# Patient Record
Sex: FEMALE | Race: BLACK OR AFRICAN AMERICAN | NOT HISPANIC OR LATINO
[De-identification: names, ages, dates, MRNs, and addresses within clinical notes are randomized per-mention and may not be internally consistent; named-entity substitution may affect disease eponyms.]

---

## 2017-01-17 ENCOUNTER — RECORD ABSTRACTING (OUTPATIENT)
Age: 37
End: 2017-01-17

## 2017-01-17 DIAGNOSIS — Z78.9 OTHER SPECIFIED HEALTH STATUS: ICD-10-CM

## 2017-01-17 DIAGNOSIS — O00.90 UNSPECIFIED. ECTOPIC. PREGNANCY WITHOUT INTRAUTERINE PREGNANCY: ICD-10-CM

## 2017-01-17 DIAGNOSIS — Z83.3 FAMILY HISTORY OF DIABETES MELLITUS: ICD-10-CM

## 2017-08-29 ENCOUNTER — RESULT REVIEW (OUTPATIENT)
Age: 37
End: 2017-08-29

## 2018-06-25 ENCOUNTER — RESULT REVIEW (OUTPATIENT)
Age: 38
End: 2018-06-25

## 2019-08-24 ENCOUNTER — EMERGENCY (EMERGENCY)
Facility: HOSPITAL | Age: 39
LOS: 0 days | Discharge: HOME | End: 2019-08-25
Attending: EMERGENCY MEDICINE | Admitting: EMERGENCY MEDICINE
Payer: COMMERCIAL

## 2019-08-24 VITALS
SYSTOLIC BLOOD PRESSURE: 148 MMHG | HEIGHT: 72 IN | HEART RATE: 82 BPM | TEMPERATURE: 97 F | DIASTOLIC BLOOD PRESSURE: 78 MMHG | RESPIRATION RATE: 18 BRPM | WEIGHT: 253.09 LBS | OXYGEN SATURATION: 99 %

## 2019-08-24 DIAGNOSIS — N93.9 ABNORMAL UTERINE AND VAGINAL BLEEDING, UNSPECIFIED: ICD-10-CM

## 2019-08-24 DIAGNOSIS — O03.4 INCOMPLETE SPONTANEOUS ABORTION WITHOUT COMPLICATION: ICD-10-CM

## 2019-08-24 DIAGNOSIS — Z88.1 ALLERGY STATUS TO OTHER ANTIBIOTIC AGENTS STATUS: ICD-10-CM

## 2019-08-24 PROCEDURE — 99284 EMERGENCY DEPT VISIT MOD MDM: CPT

## 2019-08-24 RX ORDER — SODIUM CHLORIDE 9 MG/ML
1000 INJECTION INTRAMUSCULAR; INTRAVENOUS; SUBCUTANEOUS ONCE
Refills: 0 | Status: COMPLETED | OUTPATIENT
Start: 2019-08-24 | End: 2019-08-24

## 2019-08-24 RX ORDER — KETOROLAC TROMETHAMINE 30 MG/ML
15 SYRINGE (ML) INJECTION ONCE
Refills: 0 | Status: DISCONTINUED | OUTPATIENT
Start: 2019-08-24 | End: 2019-08-24

## 2019-08-24 NOTE — ED PROVIDER NOTE - CLINICAL SUMMARY MEDICAL DECISION MAKING FREE TEXT BOX
38 yo female with vaginal bleeding s/p medical TOP, elevated hCG, seen  by OB-GYN, put on beta hCG list for tomorrow, stable for d.c home.

## 2019-08-24 NOTE — ED PROVIDER NOTE - PROGRESS NOTE DETAILS
The patient appears well, beta hCG is elevated, no IUP or definite ectopic pregnancy.  Case  d/w OB-GYN, will see patient in ED. The patient was seen by OB-GYN and cleared for d/c home.  She is [facility].  Advised to f/u OB-GYN clinic, strict return precautions given.  Incomplete ab vs pregnancy of unknown location. Patient to be discharged from ED. Any available test results were discussed with patient . Verbal instructions given, including instructions to return to ED immediately for any new, worsening, or concerning symptoms. Patient endorsed understanding. Written discharge instructions additionally given, including follow-up plan.  Patient was given opportunity to ask questions.

## 2019-08-24 NOTE — ED PROVIDER NOTE - NSFOLLOWUPCLINICS_GEN_ALL_ED_FT
[facility]  OB/GYN  [address] [place] [address]  Phone: [phone]  Fax:   Follow Up Time: 4-6 Days

## 2019-08-24 NOTE — ED PROVIDER NOTE - PHYSICAL EXAMINATION
Vital Signs: I have reviewed the initial vital signs.  Constitutional: well-nourished, appears stated age, no acute distress  HEENT: PERRL, mmm  Cardiovascular: regular rate, regular rhythm, well-perfused extremities  Respiratory: unlabored respiratory effort, clear to auscultation bilaterally, speaking full sentences  Gastrointestinal: soft, non-distended abdomen, no pulsatile mass, mild rt pelvic ttp without rebound or guarding  Musculoskeletal: supple neck, no lower extremity edema, no midline spine or CVA ttp  Integumentary: warm, dry, no rash  Neurologic: awake, alert, cranial nerves II-XII grossly intact, extremities’ motor and sensory functions grossly intact  Psychiatric: appropriate mood, appropriate affect

## 2019-08-24 NOTE — ED ADULT TRIAGE NOTE - CHIEF COMPLAINT QUOTE
pt co heavy/clotty bleeding from vagina a/w abd pain starting today, sts took day after pill Tuesday,  thought she was pregnant. denies fever, n/v/d,

## 2019-08-24 NOTE — ED PROVIDER NOTE - CARE PLAN
Principal Discharge DX:	Vaginal bleeding affecting early pregnancy  Secondary Diagnosis:	Incomplete

## 2019-08-24 NOTE — ED PROVIDER NOTE - NS ED ROS FT
Constitutional: (-) fever, (-) chills  Eyes/ENT: (-) blurry vision, (-) epistaxis  Cardiovascular: (-) chest pain, (-) syncope, (-) palpitations  Respiratory: (-) cough, (-) shortness of breath  Gastrointestinal: (-)nausea or vomiting, (-) diarrhea, (+) lower abdominal pain  :(-) dysuria, (+) vaginal bleeding  Musculoskeletal: (-) neck pain, (-) back pain, (-) joint pain  Integumentary: (-) rash, (-) edema  Neurological: (-) headache, (-) altered mental status  Allergic/Immunologic: (-) pruritus

## 2019-08-24 NOTE — ED PROVIDER NOTE - NSFOLLOWUPINSTRUCTIONS_ED_ALL_ED_FT
Vaginal Bleeding in Pregnancy: Threatened     A threatened miscarriage occurs when you have vaginal bleeding during your first 20 weeks of pregnancy but the pregnancy has not ended. If you have vaginal bleeding during this time, your health care provider will do tests to make sure you are still pregnant. If the tests show you are still pregnant and the developing baby (fetus) inside your womb (uterus) is still growing, your condition is considered a threatened miscarriage. A threatened miscarriage does not mean your pregnancy will end, but it does increase the risk of losing your pregnancy.    SEEK IMMEDIATE MEDICAL CARE IF YOU HAVE THE FOLLOWING SYMPTOMS: heavy vaginal bleeding, severe low back or abdominal cramps, fever/chills, or lightheadedness/dizziness.

## 2019-08-24 NOTE — ED PROVIDER NOTE - OBJECTIVE STATEMENT
38 yo female h/o anemia here for evaluation of vaginal bleeding with clots for 1 day.  LMP in June, [facility]", had pregnancy test done, her hCG was in the 200 range, she took morning after pill on Tuesday and " nothing happened", On Thursday labs were repeated and this time hCG was in 500s, she was given another pill ( either Wed or Thu), started spotting yesterday, more bleeding today.  Will get labs, pelvic sono, treat pain , give fluids and reassess.

## 2019-08-25 LAB
ANION GAP SERPL CALC-SCNC: 13 MMOL/L — SIGNIFICANT CHANGE UP (ref 7–14)
APPEARANCE UR: CLEAR — SIGNIFICANT CHANGE UP
BACTERIA # UR AUTO: NEGATIVE — SIGNIFICANT CHANGE UP
BASOPHILS # BLD AUTO: 0.01 K/UL — SIGNIFICANT CHANGE UP (ref 0–0.2)
BASOPHILS NFR BLD AUTO: 0.1 % — SIGNIFICANT CHANGE UP (ref 0–1)
BILIRUB UR-MCNC: NEGATIVE — SIGNIFICANT CHANGE UP
BLD GP AB SCN SERPL QL: SIGNIFICANT CHANGE UP
BUN SERPL-MCNC: 12 MG/DL — SIGNIFICANT CHANGE UP (ref 10–20)
CALCIUM SERPL-MCNC: 9.1 MG/DL — SIGNIFICANT CHANGE UP (ref 8.5–10.1)
CHLORIDE SERPL-SCNC: 105 MMOL/L — SIGNIFICANT CHANGE UP (ref 98–110)
CO2 SERPL-SCNC: 21 MMOL/L — SIGNIFICANT CHANGE UP (ref 17–32)
COLOR SPEC: YELLOW — SIGNIFICANT CHANGE UP
CREAT SERPL-MCNC: 1 MG/DL — SIGNIFICANT CHANGE UP (ref 0.7–1.5)
DIFF PNL FLD: ABNORMAL
EOSINOPHIL # BLD AUTO: 0.15 K/UL — SIGNIFICANT CHANGE UP (ref 0–0.7)
EOSINOPHIL NFR BLD AUTO: 2.2 % — SIGNIFICANT CHANGE UP (ref 0–8)
EPI CELLS # UR: 2 /HPF — SIGNIFICANT CHANGE UP (ref 0–5)
GLUCOSE SERPL-MCNC: 96 MG/DL — SIGNIFICANT CHANGE UP (ref 70–99)
GLUCOSE UR QL: NEGATIVE — SIGNIFICANT CHANGE UP
HCG SERPL-ACNC: 1425 MIU/ML — HIGH
HCT VFR BLD CALC: 27.3 % — LOW (ref 37–47)
HGB BLD-MCNC: 8.3 G/DL — LOW (ref 12–16)
HYALINE CASTS # UR AUTO: 2 /LPF — SIGNIFICANT CHANGE UP (ref 0–7)
IMM GRANULOCYTES NFR BLD AUTO: 0.3 % — SIGNIFICANT CHANGE UP (ref 0.1–0.3)
KETONES UR-MCNC: SIGNIFICANT CHANGE UP
LEUKOCYTE ESTERASE UR-ACNC: NEGATIVE — SIGNIFICANT CHANGE UP
LYMPHOCYTES # BLD AUTO: 2.3 K/UL — SIGNIFICANT CHANGE UP (ref 1.2–3.4)
LYMPHOCYTES # BLD AUTO: 33.5 % — SIGNIFICANT CHANGE UP (ref 20.5–51.1)
MCHC RBC-ENTMCNC: 23.9 PG — LOW (ref 27–31)
MCHC RBC-ENTMCNC: 30.4 G/DL — LOW (ref 32–37)
MCV RBC AUTO: 78.4 FL — LOW (ref 81–99)
MONOCYTES # BLD AUTO: 0.72 K/UL — HIGH (ref 0.1–0.6)
MONOCYTES NFR BLD AUTO: 10.5 % — HIGH (ref 1.7–9.3)
NEUTROPHILS # BLD AUTO: 3.66 K/UL — SIGNIFICANT CHANGE UP (ref 1.4–6.5)
NEUTROPHILS NFR BLD AUTO: 53.4 % — SIGNIFICANT CHANGE UP (ref 42.2–75.2)
NITRITE UR-MCNC: NEGATIVE — SIGNIFICANT CHANGE UP
NRBC # BLD: 0 /100 WBCS — SIGNIFICANT CHANGE UP (ref 0–0)
PH UR: 6 — SIGNIFICANT CHANGE UP (ref 5–8)
PLATELET # BLD AUTO: 268 K/UL — SIGNIFICANT CHANGE UP (ref 130–400)
POTASSIUM SERPL-MCNC: 3.9 MMOL/L — SIGNIFICANT CHANGE UP (ref 3.5–5)
POTASSIUM SERPL-SCNC: 3.9 MMOL/L — SIGNIFICANT CHANGE UP (ref 3.5–5)
PROT UR-MCNC: ABNORMAL
RBC # BLD: 3.48 M/UL — LOW (ref 4.2–5.4)
RBC # FLD: 16.7 % — HIGH (ref 11.5–14.5)
RBC CASTS # UR COMP ASSIST: 60 /HPF — HIGH (ref 0–4)
SODIUM SERPL-SCNC: 139 MMOL/L — SIGNIFICANT CHANGE UP (ref 135–146)
SP GR SPEC: 1.04 — HIGH (ref 1.01–1.02)
UROBILINOGEN FLD QL: SIGNIFICANT CHANGE UP
WBC # BLD: 6.86 K/UL — SIGNIFICANT CHANGE UP (ref 4.8–10.8)
WBC # FLD AUTO: 6.86 K/UL — SIGNIFICANT CHANGE UP (ref 4.8–10.8)
WBC UR QL: 17 /HPF — HIGH (ref 0–5)

## 2019-08-25 PROCEDURE — 76856 US EXAM PELVIC COMPLETE: CPT | Mod: 26

## 2019-08-25 RX ADMIN — SODIUM CHLORIDE 1000 MILLILITER(S): 9 INJECTION INTRAMUSCULAR; INTRAVENOUS; SUBCUTANEOUS at 01:10

## 2019-08-25 RX ADMIN — Medication 15 MILLIGRAM(S): at 01:09

## 2019-08-25 NOTE — CONSULT NOTE ADULT - SUBJECTIVE AND OBJECTIVE BOX
SATINDER BARKER   39y   Female   [id number]    Chief Complaint/Reason for Consultation: Pregnancy of unknown location    HPI: 38 yo  at 9w by clare LMP of 2019 here for vaginal bleeding s/p medical termination. Pt reports that she had a vaginal delivery 15 months ago and felt recently like she was pregnant again. [facility], where it was noted that she had a urine pos pregnancy test, no IUP or adnexal mass on TVUS and bhcg of 200s in serum on 2019. She was counseled regarding her options of management and she decided she wanted to go with medical management. She reports that she took the first pill on Tuesday and the rest of the pills on Wednesday. She did not experience any cramping or vaginal bleeding at first. Then went back to the clinic on 2019 to see if it worked. They told her it was too early to have results, therefore she went back on 2019, on which she started spotting. She had another bhcg value done that day, which came out to be in the 500s per pt. Pt reports that after that appt on 2019 she started bleeding very heavily with nickel sized clots, not able to quantify as she passed a lot of the time in the bathroom. Denies passing anything that looks like tissue. But since Friday night she only had light bleeding that would not even soak a pad. She never experienced severe cramping either. She denies fever/chills, N/V, diarrhea, abnormal vaginal discharge, SOB, chest pain, palpitations, dysuria, frequency, urgency, sore throat, runny nose, cough, sick contacts and recent travel. She also reports having chronic anemia and that she feels dizzy at times. She reports not having a GYN. This is unplanned and undesired pregnancy.      MEDICATIONS:  None    ALLERGIES:  amoxicillin (itchiness)    PAST MEDICAL & SURGICAL HISTORY:  No pertinent medical history  H/o dilation and curettage    OB/GYN HISTORY:    Gyn: h/o fibroids; h/o abnormal pap w/ neg repeat since then all neg; denies history of STIs and ovarian cysts  Obstetric: ; FT  x1; ETOP x5 all w/ D&Cs; SAB x2 w/ D&Cs      FAMILY HISTORY:  No pertinent family history    SOCIAL HISTORY:   Denies cigarette use, alcohol use, or illicit drug use    REVIEW OF SYSTEMS:  Neg unless otherwise indicated in HPI    Vital Signs Last 24 Hrs  T(C): 36.2 (24 Aug 2019 22:31), Max: 36.2 (24 Aug 2019 22:31)  T(F): 97.1 (24 Aug 2019 22:31), Max: 97.1 (24 Aug 2019 22:31)  HR: 82 (24 Aug 2019 22:31) (82 - 82)  BP: 148/78 (24 Aug 2019 22:31) (148/78 - 148/78)  RR: 18 (24 Aug 2019 22:31) (18 - 18)  SpO2: 99% (24 Aug 2019 22:31) (99% - 99%)    Physical Exam:  Constitutional: AAOx3, NAD  Respiratory: CTAB  Cardiovascular: NL S1/S2  Gastrointestinal: Soft, nontender, nondistended, no rebound, guarding, or rigidity  Pelvic: Normal vulva, normal vagina, 2 cc bleeding noted, speculum inserted, 5cc of dark brown blood noted with clots, small amount of tissue with blood clots protruding out of the cervix, otherwise cervix normal, external os 0.5 cm dilated, no active bleeding, no abnormal discharge, uterus and adnexa cannot be palpated    LABS:                        8.3    6.86  )-----------( 268      ( 24 Aug 2019 23:30 )             27.3     HCG Quantitative, Serum: 1425.0 mIU/mL (19 @ 23:30)    ABO RH Interpretation: O POS (19 @ 23:30)  Antibody Screen: NEG (19 @ 23:30)        139  |  105  |  12  ----------------------------<  96  3.9   |  21  |  1.0    Ca    9.1      24 Aug 2019 23:30    Urinalysis Basic - ( 25 Aug 2019 00:30 )  Color: Yellow / Appearance: Clear / S.037 / pH: x  Gluc: x / Ketone: Trace  / Bili: Negative / Urobili: <2 mg/dL   Blood: x / Protein: 30 mg/dL / Nitrite: Negative   Leuk Esterase: Negative / RBC: 60 /HPF / WBC 17 /HPF   Sq Epi: x / Non Sq Epi: 2 /HPF / Bacteria: Negative    RADIOLOGY & ADDITIONAL STUDIES:    TVUS (2019):    FINDINGS:     The uterus measures 11.6 x 6.5 x 6.9 cm.   The endometrial echo complex measures 1.2 cm with debris noted within the   endometrial canal.   No intrauterine pregnancy is identified at this time.   Partially calcified uterine fibroids are noted, the largest fibroids   measures 4.4 x 4 x 4.3 cm and 5.5 x 4.3 x 5.3 cm.     There is trace free pelvic fluid.     The right ovary measures 3.6 x 2 x 1.9 cm. The left ovary measures 2.2 x 1.9   x 1.8 cm.   No extraovarian adnexal mass identified.     Doppler flow is demonstrated to both ovaries. SATINDER BARKER   39y   Female   [id number]    Chief Complaint/Reason for Consultation: Pregnancy of unknown location    HPI: 38 yo  at 9w by clare LMP of 2019 here for vaginal bleeding s/p medical termination. Pt reports that she had a vaginal delivery 15 months ago and felt recently like she was pregnant again. [facility], where it was noted that she had a urine pos pregnancy test, no IUP or adnexal mass on TVUS and bhcg of 200s in serum on 2019. She was counseled regarding her options of management and she decided she wanted to go with medical management. She reports that she took the first pill on 2019 and the rest of the pills on 2019 She did not experience any cramping or vaginal bleeding at first. Then went back to the clinic on 2019 to see if it worked. They told her it was too early to have results, therefore she went back on 2019, on which she started spotting. She had another bhcg value done that day, which came out to be in the 500s per pt. Although pt is not very clear on bhcg values. Pt reports that after that appt on 2019 she started bleeding very heavily with nickel sized clots, not able to quantify as she passed a lot of the time in the bathroom. Denies passing anything that looks like tissue. But since Friday night she only had light bleeding that would not even soak a pad. She never experienced severe cramping either. She denies fever/chills, N/V, diarrhea, abnormal vaginal discharge, SOB, chest pain, palpitations, dysuria, frequency, urgency, sore throat, runny nose, cough, sick contacts and recent travel. She also reports having chronic anemia and that she feels dizzy at times. She reports not having a GYN. This is unplanned and undesired pregnancy.      MEDICATIONS:  None    ALLERGIES:  amoxicillin (itchiness)    PAST MEDICAL & SURGICAL HISTORY:  No pertinent medical history  H/o dilation and curettage    OB/GYN HISTORY:    Gyn: h/o fibroids; h/o abnormal pap w/ neg repeat since then all neg; denies history of STIs and ovarian cysts  Obstetric: ; FT  x1; ETOP x5 all w/ D&Cs; SAB x2 w/ D&Cs      FAMILY HISTORY:  No pertinent family history    SOCIAL HISTORY:   Denies cigarette use, alcohol use, or illicit drug use    REVIEW OF SYSTEMS:  Neg unless otherwise indicated in HPI    Vital Signs Last 24 Hrs  T(C): 36.2 (24 Aug 2019 22:31), Max: 36.2 (24 Aug 2019 22:31)  T(F): 97.1 (24 Aug 2019 22:31), Max: 97.1 (24 Aug 2019 22:31)  HR: 82 (24 Aug 2019 22:31) (82 - 82)  BP: 148/78 (24 Aug 2019 22:31) (148/78 - 148/78)  RR: 18 (24 Aug 2019 22:31) (18 - 18)  SpO2: 99% (24 Aug 2019 22:31) (99% - 99%)    Physical Exam:  Constitutional: AAOx3, NAD  Respiratory: CTAB  Cardiovascular: NL S1/S2  Gastrointestinal: Soft, nontender, nondistended, no rebound, guarding, or rigidity  Pelvic: Normal vulva, normal vagina, 2 cc bleeding noted, speculum inserted, 5cc of dark brown blood noted with clots, small amount of tissue with blood clots protruding out of the cervix, otherwise cervix normal, external os 0.5 cm dilated, no active bleeding, no abnormal discharge, uterus and adnexa cannot be palpated    LABS:                        8.3    6.86  )-----------( 268      ( 24 Aug 2019 23:30 )             27.3     HCG Quantitative, Serum: 1425.0 mIU/mL (19 @ 23:30)    ABO RH Interpretation: O POS (19 @ 23:30)  Antibody Screen: NEG (19 @ 23:30)        139  |  105  |  12  ----------------------------<  96  3.9   |  21  |  1.0    Ca    9.1      24 Aug 2019 23:30    Urinalysis Basic - ( 25 Aug 2019 00:30 )  Color: Yellow / Appearance: Clear / S.037 / pH: x  Gluc: x / Ketone: Trace  / Bili: Negative / Urobili: <2 mg/dL   Blood: x / Protein: 30 mg/dL / Nitrite: Negative   Leuk Esterase: Negative / RBC: 60 /HPF / WBC 17 /HPF   Sq Epi: x / Non Sq Epi: 2 /HPF / Bacteria: Negative    RADIOLOGY & ADDITIONAL STUDIES:    TVUS (2019):    FINDINGS:     The uterus measures 11.6 x 6.5 x 6.9 cm.   The endometrial echo complex measures 1.2 cm with debris noted within the   endometrial canal.   No intrauterine pregnancy is identified at this time.   Partially calcified uterine fibroids are noted, the largest fibroids   measures 4.4 x 4 x 4.3 cm and 5.5 x 4.3 x 5.3 cm.     There is trace free pelvic fluid.     The right ovary measures 3.6 x 2 x 1.9 cm. The left ovary measures 2.2 x 1.9   x 1.8 cm.   No extraovarian adnexal mass identified.     Doppler flow is demonstrated to both ovaries.

## 2019-08-25 NOTE — CONSULT NOTE ADULT - ASSESSMENT
40 yo  at 9w by clare LMP of 2019 w/ vaginal bleeding s/p medical termination, w/ pregnancy of unknown location, w/ incomplete  vs. early IUP, cannot r/o ectopic pregnancy, hemodynamically and clinically stable, no acute OB/GYN interventions needed,     -Ectopic/bleeding/infection precautions given  -F/u bhcg and progesterone on 19 (script given) - come up with a plan accordingly  -Close f/u on beta list    Dr. Abad and Dr. Hahn to be made aware. 38 yo  at 9w by clare LMP of 2019 w/ vaginal bleeding s/p medical termination, w/ pregnancy of unknown location, w/ most likely incomplete , cannot r/o ectopic pregnancy, hemodynamically and clinically stable, no acute OB/GYN interventions needed,     -Ectopic/bleeding/infection precautions given  -F/u bhcg and progesterone on 19 (script given) - come up with a plan accordingly  -Close f/u on beta list    Dr. Abad and Dr. Hahn aware. 40 yo  at 9w by clare LMP of 2019 w/ vaginal bleeding s/p medical termination, w/ pregnancy of unknown location, w/ most likely incomplete , cannot r/o ectopic pregnancy, hemodynamically and clinically stable, no acute OB/GYN interventions needed,     -Ectopic/bleeding/infection precautions given  -F/u bhcg and progesterone on 19 (script given)  -Close f/u on beta list  -disposition per ED    Dr. Abad and Dr. Hahn aware.

## 2019-08-25 NOTE — ED ADULT NURSE NOTE - OBJECTIVE STATEMENT
came in c/o vaginal bleed. [facility] and [facility] . As per patient the bleeding got worse today (clots) . Patient is alert ,oriented .

## 2019-08-25 NOTE — ED ADULT NURSE NOTE - NSIMPLEMENTINTERV_GEN_ALL_ED
Implemented All Universal Safety Interventions:  [place] to call system. Call bell, personal items and telephone within reach. Instruct patient to call for assistance. Room bathroom lighting operational. Non-slip footwear when patient is off stretcher. Physically safe environment: no spills, clutter or unnecessary equipment. Stretcher in lowest position, wheels locked, appropriate side rails in place.

## 2019-08-26 ENCOUNTER — LABORATORY RESULT (OUTPATIENT)
Age: 39
End: 2019-08-26

## 2019-08-26 ENCOUNTER — OUTPATIENT (OUTPATIENT)
Dept: OUTPATIENT SERVICES | Facility: HOSPITAL | Age: 39
LOS: 1 days | Discharge: HOME | End: 2019-08-26

## 2019-08-26 DIAGNOSIS — Z34.00 ENCOUNTER FOR SUPERVISION OF NORMAL FIRST PREGNANCY, UNSPECIFIED TRIMESTER: ICD-10-CM

## 2019-09-14 ENCOUNTER — OUTPATIENT (OUTPATIENT)
Dept: OUTPATIENT SERVICES | Facility: HOSPITAL | Age: 39
LOS: 1 days | Discharge: HOME | End: 2019-09-14

## 2019-09-14 ENCOUNTER — LABORATORY RESULT (OUTPATIENT)
Age: 39
End: 2019-09-14

## 2019-09-14 DIAGNOSIS — Z34.00 ENCOUNTER FOR SUPERVISION OF NORMAL FIRST PREGNANCY, UNSPECIFIED TRIMESTER: ICD-10-CM

## 2019-09-14 NOTE — CHART NOTE - NSCHARTNOTEFT_GEN_A_CORE
PGY2 note    38 yo  presented to the ED at 9w by LMP of 2019 for vaginal bleeding s/p medical termination. Had a vaginal delivery 15 months prior and felt like she was pregnant again. Went to see her PMD, where it was noted that she had a urine pos pregnancy test, no IUP or adnexal mass on TVUS and bhcg of 200s in serum on 2019. She was counseled regarding her options of management and she decided she wanted to go with medical management. Took the first pill on 2019 and the rest of the pills on 2019. She went back on 2019, on which she started spotting. She had another bhcg value done that day, 500s per pt (pt not sure). Pt reported that after that appt on 2019 she started bleeding very heavily, now only light bleeding. Unplanned and undesired pregnancy.     Running     ddx: most likely incomplete , cannot r/o ectopic    Lab work  @ [facility]    : bhcg  (given mifepristone + misoprostol)    : bhcg 5389    : dx with failed AB      : bhcg 1425, O pos,  6.86>8.3/27.3<268, 139/3.9/105/21/12/1.0<96, UA 30 protein, mod blood    : bhcg 475, progesterone 2.9    : bhcg <0.6       TVUS: The uterus measures 11.6 x 6.5 x 6.9 cm.   The endometrial echo complex measures 1.2 cm with debris noted within the endometrial canal. No intrauterine pregnancy is identified at this time. Partially calcified uterine fibroids are noted, the largest fibroids measures 4.4 x 4 x 4.3 cm and 5.5 x 4.3 x 5.3 cm. There is trace free pelvic fluid. The right ovary measures 3.6 x 2 x 1.9 cm. The left ovary measures 2.2 x 1.9 x 1.8 cm. No extraovarian adnexal mass identified. Doppler flow is demonstrated to both ovaries.     Course  ": [facility], no son[facility] with IUP necessary before medical termination (nurse named Claudia?)      9/3: repeat bhcg (pt going [address] at 1500) NO SHOW    -, : left VM    : spoke to pt, going for beta    9/10: script did not work, pt to tell us where to fax script    -: left VM     - attending and patient aware    A/P 39y  likely with missed , no gyn intervention necessary  -GYN to sign off PGY2 note    40 yo  presented to the ED at 9w by LMP of 2019 for vaginal bleeding s/p medical termination. Had a vaginal delivery 15 months prior and felt like she was pregnant again. Went to see her PMD, where it was noted that she had a urine pos pregnancy test, no IUP or adnexal mass on TVUS and bhcg of 200s in serum on 2019. She was counseled regarding her options of management and she decided she wanted to go with medical management. Took the first pill on 2019 and the rest of the pills on 2019. She went back on 2019, on which she started spotting. She had another bhcg value done that day, 500s per pt (pt not sure). Pt reported that after that appt on 2019 she started bleeding very heavily, now only light bleeding. Unplanned and undesired pregnancy.     Running     ddx: most likely incomplete , cannot r/o ectopic    Lab work  @ [facility]    : bhcg  (given mifepristone + misoprostol)    : bhcg 5389    : dx with failed AB      : bhcg 1425, O pos,  6.86>8.3/27.3<268, 139/3.9/105/21/12/1.0<96, UA 30 protein, mod blood    : bhcg 475, progesterone 2.9    : bhcg <0.6       TVUS: The uterus measures 11.6 x 6.5 x 6.9 cm.   The endometrial echo complex measures 1.2 cm with debris noted within the endometrial canal. No intrauterine pregnancy is identified at this time. Partially calcified uterine fibroids are noted, the largest fibroids measures 4.4 x 4 x 4.3 cm and 5.5 x 4.3 x 5.3 cm. There is trace free pelvic fluid. The right ovary measures 3.6 x 2 x 1.9 cm. The left ovary measures 2.2 x 1.9 x 1.8 cm. No extraovarian adnexal mass identified. Doppler flow is demonstrated to both ovaries.     Course  ": [facility] no [facility] with IUP necessary before medical termination (nurse named Claudia?)      9/3: repeat bhcg (pt going [address] at 1500) NO SHOW    -, : left VM    : spoke to pt, going for beta    9/10: script did not work, pt to tell us where to fax script    -: left VM     - attending and patient aware    A/P 39y  likely with missed , no gyn intervention necessary  -GYN to sign off    Dr. Maradiaga and Dr. Tijerina aware

## 2019-09-15 NOTE — CHART NOTE - NSCHARTNOTEFT_GEN_A_CORE
40yo  at 9w by clare LMP 2019 on 2019 was here for vaginal bleedng s/p medical termination. Had a vaginal delivery 15 months prior and felt like she was pregnant again. [facility] where it was noted she had a positive urine pos pregnany test, no IUP or adnexal mass on TVUS and bhcg of 2015 in serum on 2019. She was counseled regarding her options of management and she decided she wanted to go with [facility] management. Took mifepristone on 2019 and misoprostol on 2019. She went back on 2019, on which day she started spotting. She had another bhcg value done that day, 5389. Pt reports that after that appt on 2019 she started bleeding very heavily, at presentation to our ED only light bleeding. They diagnosed her with failed  on 2019. Unplanned and undesired pregnancy.     On 2019, pt wasn't sure about her bhcg values on 2019 and on 2019 and we did not have the information we have now from [facility]. Her lab work that day was as follows; bhcg 1425, O pso, 6.86>8.3/27.3<268, 139/3.9/105/21/12/1.0<96, UA 30 protein, mod blood. TVUS showed no intrauterine pregnancy or extraovarian adnexal mass suspicious for an ectopic. We told her we could not be sure if she had an incomplete , abnormal IUP, normal IUP or an ectopic. She was given ectopic, bleeding and infection precautions. Was sent home with a script to repeat her bhcg and progesterone on 2019. Pt went to get this bloodwork done and her bhcg dropped to 475, and progesterone was 2.9, pointing towards an incomplete medical . She was counseled on the importance of following the bhcg to neg. She showed understanding and was given scripts to go. Afterwards she did not go and she did not answer our phone calls for a while. Yesterday, on 2019, she finally had a chance to repeat her bhcg and it was <0.6. She no longer has any complaints of vaginal bleeding. No pain, no abnormal discharge or any other complaints on the phone today. She is informed that she most likely passed all of the tissue and she is no longer in danger for getting an ectopic pregnancy and will no longer be followed on our list.     Dr. Kuo and Dr. Brothers aware.

## 2020-07-13 ENCOUNTER — RESULT REVIEW (OUTPATIENT)
Age: 40
End: 2020-07-13

## 2020-07-13 ENCOUNTER — EMERGENCY (EMERGENCY)
Facility: HOSPITAL | Age: 40
LOS: 0 days | Discharge: HOME | End: 2020-07-13
Attending: EMERGENCY MEDICINE | Admitting: EMERGENCY MEDICINE
Payer: COMMERCIAL

## 2020-07-13 VITALS
RESPIRATION RATE: 17 BRPM | DIASTOLIC BLOOD PRESSURE: 70 MMHG | TEMPERATURE: 99 F | OXYGEN SATURATION: 99 % | WEIGHT: 238.1 LBS | SYSTOLIC BLOOD PRESSURE: 150 MMHG | HEART RATE: 95 BPM

## 2020-07-13 VITALS
SYSTOLIC BLOOD PRESSURE: 131 MMHG | DIASTOLIC BLOOD PRESSURE: 60 MMHG | TEMPERATURE: 98 F | RESPIRATION RATE: 16 BRPM | HEART RATE: 82 BPM | OXYGEN SATURATION: 100 %

## 2020-07-13 DIAGNOSIS — Z87.42 PERSONAL HISTORY OF OTHER DISEASES OF THE FEMALE GENITAL TRACT: ICD-10-CM

## 2020-07-13 DIAGNOSIS — O09.529 SUPERVISION OF ELDERLY MULTIGRAVIDA, UNSPECIFIED TRIMESTER: ICD-10-CM

## 2020-07-13 DIAGNOSIS — Z34.90 ENCOUNTER FOR SUPERVISION OF NORMAL PREGNANCY, UNSPECIFIED, UNSPECIFIED TRIMESTER: ICD-10-CM

## 2020-07-13 DIAGNOSIS — N93.9 ABNORMAL UTERINE AND VAGINAL BLEEDING, UNSPECIFIED: ICD-10-CM

## 2020-07-13 DIAGNOSIS — Z88.1 ALLERGY STATUS TO OTHER ANTIBIOTIC AGENTS STATUS: ICD-10-CM

## 2020-07-13 DIAGNOSIS — O20.9 HEMORRHAGE IN EARLY PREGNANCY, UNSPECIFIED: ICD-10-CM

## 2020-07-13 LAB
ALBUMIN SERPL ELPH-MCNC: 3.6 G/DL — SIGNIFICANT CHANGE UP (ref 3.5–5.2)
ALP SERPL-CCNC: 84 U/L — SIGNIFICANT CHANGE UP (ref 30–115)
ALT FLD-CCNC: 7 U/L — SIGNIFICANT CHANGE UP (ref 0–41)
ANION GAP SERPL CALC-SCNC: 9 MMOL/L — SIGNIFICANT CHANGE UP (ref 7–14)
APPEARANCE UR: CLEAR — SIGNIFICANT CHANGE UP
APTT BLD: 24.2 SEC — LOW (ref 27–39.2)
AST SERPL-CCNC: 10 U/L — SIGNIFICANT CHANGE UP (ref 0–41)
BACTERIA # UR AUTO: NEGATIVE — SIGNIFICANT CHANGE UP
BASOPHILS # BLD AUTO: 0.02 K/UL — SIGNIFICANT CHANGE UP (ref 0–0.2)
BASOPHILS NFR BLD AUTO: 0.3 % — SIGNIFICANT CHANGE UP (ref 0–1)
BILIRUB SERPL-MCNC: <0.2 MG/DL — SIGNIFICANT CHANGE UP (ref 0.2–1.2)
BILIRUB UR-MCNC: NEGATIVE — SIGNIFICANT CHANGE UP
BLD GP AB SCN SERPL QL: SIGNIFICANT CHANGE UP
BUN SERPL-MCNC: 11 MG/DL — SIGNIFICANT CHANGE UP (ref 10–20)
CALCIUM SERPL-MCNC: 8.8 MG/DL — SIGNIFICANT CHANGE UP (ref 8.5–10.1)
CHLORIDE SERPL-SCNC: 105 MMOL/L — SIGNIFICANT CHANGE UP (ref 98–110)
CO2 SERPL-SCNC: 23 MMOL/L — SIGNIFICANT CHANGE UP (ref 17–32)
COLOR SPEC: YELLOW — SIGNIFICANT CHANGE UP
CREAT SERPL-MCNC: 0.7 MG/DL — SIGNIFICANT CHANGE UP (ref 0.7–1.5)
DIFF PNL FLD: ABNORMAL
EOSINOPHIL # BLD AUTO: 0.14 K/UL — SIGNIFICANT CHANGE UP (ref 0–0.7)
EOSINOPHIL NFR BLD AUTO: 2.2 % — SIGNIFICANT CHANGE UP (ref 0–8)
EPI CELLS # UR: 1 /HPF — SIGNIFICANT CHANGE UP (ref 0–5)
GLUCOSE SERPL-MCNC: 106 MG/DL — HIGH (ref 70–99)
GLUCOSE UR QL: NEGATIVE — SIGNIFICANT CHANGE UP
HCG SERPL-ACNC: 6894 MIU/ML — HIGH
HCT VFR BLD CALC: 26.8 % — LOW (ref 37–47)
HGB BLD-MCNC: 8.3 G/DL — LOW (ref 12–16)
HYALINE CASTS # UR AUTO: 1 /LPF — SIGNIFICANT CHANGE UP (ref 0–7)
IMM GRANULOCYTES NFR BLD AUTO: 0.3 % — SIGNIFICANT CHANGE UP (ref 0.1–0.3)
INR BLD: 1.03 RATIO — SIGNIFICANT CHANGE UP (ref 0.65–1.3)
KETONES UR-MCNC: NEGATIVE — SIGNIFICANT CHANGE UP
LEUKOCYTE ESTERASE UR-ACNC: NEGATIVE — SIGNIFICANT CHANGE UP
LYMPHOCYTES # BLD AUTO: 2.26 K/UL — SIGNIFICANT CHANGE UP (ref 1.2–3.4)
LYMPHOCYTES # BLD AUTO: 35.1 % — SIGNIFICANT CHANGE UP (ref 20.5–51.1)
MCHC RBC-ENTMCNC: 25.7 PG — LOW (ref 27–31)
MCHC RBC-ENTMCNC: 31 G/DL — LOW (ref 32–37)
MCV RBC AUTO: 83 FL — SIGNIFICANT CHANGE UP (ref 81–99)
MONOCYTES # BLD AUTO: 0.69 K/UL — HIGH (ref 0.1–0.6)
MONOCYTES NFR BLD AUTO: 10.7 % — HIGH (ref 1.7–9.3)
NEUTROPHILS # BLD AUTO: 3.3 K/UL — SIGNIFICANT CHANGE UP (ref 1.4–6.5)
NEUTROPHILS NFR BLD AUTO: 51.4 % — SIGNIFICANT CHANGE UP (ref 42.2–75.2)
NITRITE UR-MCNC: NEGATIVE — SIGNIFICANT CHANGE UP
NRBC # BLD: 0 /100 WBCS — SIGNIFICANT CHANGE UP (ref 0–0)
PH UR: 5.5 — SIGNIFICANT CHANGE UP (ref 5–8)
PLATELET # BLD AUTO: 247 K/UL — SIGNIFICANT CHANGE UP (ref 130–400)
POTASSIUM SERPL-MCNC: 4.4 MMOL/L — SIGNIFICANT CHANGE UP (ref 3.5–5)
POTASSIUM SERPL-SCNC: 4.4 MMOL/L — SIGNIFICANT CHANGE UP (ref 3.5–5)
PROT SERPL-MCNC: 6.5 G/DL — SIGNIFICANT CHANGE UP (ref 6–8)
PROT UR-MCNC: SIGNIFICANT CHANGE UP
PROTHROM AB SERPL-ACNC: 11.8 SEC — SIGNIFICANT CHANGE UP (ref 9.95–12.87)
RBC # BLD: 3.23 M/UL — LOW (ref 4.2–5.4)
RBC # FLD: 15.2 % — HIGH (ref 11.5–14.5)
RBC CASTS # UR COMP ASSIST: 14 /HPF — HIGH (ref 0–4)
SODIUM SERPL-SCNC: 137 MMOL/L — SIGNIFICANT CHANGE UP (ref 135–146)
SP GR SPEC: 1.03 — HIGH (ref 1.01–1.02)
UROBILINOGEN FLD QL: SIGNIFICANT CHANGE UP
WBC # BLD: 6.43 K/UL — SIGNIFICANT CHANGE UP (ref 4.8–10.8)
WBC # FLD AUTO: 6.43 K/UL — SIGNIFICANT CHANGE UP (ref 4.8–10.8)
WBC UR QL: 1 /HPF — SIGNIFICANT CHANGE UP (ref 0–5)

## 2020-07-13 PROCEDURE — 76830 TRANSVAGINAL US NON-OB: CPT | Mod: 26

## 2020-07-13 PROCEDURE — 99283 EMERGENCY DEPT VISIT LOW MDM: CPT

## 2020-07-13 PROCEDURE — 99285 EMERGENCY DEPT VISIT HI MDM: CPT

## 2020-07-13 PROCEDURE — 88305 TISSUE EXAM BY PATHOLOGIST: CPT | Mod: 26

## 2020-07-13 PROCEDURE — 76830 TRANSVAGINAL US NON-OB: CPT | Mod: 26,77

## 2020-07-13 RX ORDER — SODIUM CHLORIDE 9 MG/ML
1000 INJECTION INTRAMUSCULAR; INTRAVENOUS; SUBCUTANEOUS ONCE
Refills: 0 | Status: COMPLETED | OUTPATIENT
Start: 2020-07-13 | End: 2020-07-13

## 2020-07-13 RX ORDER — MORPHINE SULFATE 50 MG/1
2 CAPSULE, EXTENDED RELEASE ORAL ONCE
Refills: 0 | Status: DISCONTINUED | OUTPATIENT
Start: 2020-07-13 | End: 2020-07-13

## 2020-07-13 RX ADMIN — SODIUM CHLORIDE 1000 MILLILITER(S): 9 INJECTION INTRAMUSCULAR; INTRAVENOUS; SUBCUTANEOUS at 01:41

## 2020-07-13 RX ADMIN — MORPHINE SULFATE 2 MILLIGRAM(S): 50 CAPSULE, EXTENDED RELEASE ORAL at 01:41

## 2020-07-13 NOTE — ED ADULT NURSE NOTE - NSIMPLEMENTINTERV_GEN_ALL_ED
Implemented All Universal Safety Interventions:  [place] to call system. Call bell, personal items and telephone within reach. Instruct patient to call for assistance. Room bathroom lighting operational. Non-slip footwear when patient is off stretcher. Physically safe environment: no spills, clutter or unnecessary equipment. Stretcher in lowest position, wheels locked, appropriate side rails in place.

## 2020-07-13 NOTE — ED PROVIDER NOTE - ATTENDING CONTRIBUTION TO CARE
40 y/o female  with c/o VB since last night.  Pt unsure when her LMP was, thinks it may have been at the end of May, she thinks she might be pregnant but has not taken a home preg test.  Last night developed pelvic cramping and heavy VB with clots.  +N.  no v/d.  no f/c.  no urinary symptoms.  no cp/sob.  no ha/dizziness/loc.    pe - nad, nc/at, eomi, perrl, op -clear, cta b/l, no w/r/r, rrr, abd - soft, nt/nd, nabs, from x 4, A&O x 3, no focal neuro deficits.  -check labs, ua/ucg, pelvic sono.

## 2020-07-13 NOTE — ED PROVIDER NOTE - OBJECTIVE STATEMENT
40 yo  female hx of uterine fibroid present c/o  vaginal bleeding with lower abdominal cramping since last night. reported bleeding was heavy a night ago and she was passing clots. bleeding subsided tonight but continue with cramping pain so she came to ED for evaluation. hx of irregular menses so she doesn't recall her last night. no pregnancy test was down at home. denies  dysuria/urgency/frequency. Denies fever/chill/HA/dizziness/chest pain/palpitation/sob/abd pain/n/v/d/ black stool/bloody stool

## 2020-07-13 NOTE — CONSULT NOTE ADULT - SUBJECTIVE AND OBJECTIVE BOX
Chief Complaint: vaginal bleeding    HPI: 40yo  with LMP in April now presenting for vaginal bleeding with clots beginning yesterday.  She said bleeding began suddenly yesterday PM, with golfball-sized clot passage.  Bleeding is now less severe, using about 1-2 pads overnight.  Denies CP, SOB, palpitations, dizziness.  Also had cramping yesterday, in the lower abdomen, nonradiating, did not attempt pain relief with OTC medication.  Received morphine x1 in the ED. Pain is now resolved.  Otherwise denies headache, dysuria, hematuria, fevers, chills, N/V, diarrhea, constipation.  Last PO intake yesterday morning, last BM yesterday morning, last intercourse 2 months ago.      Ob/Gyn History:                   LMP April                   Cycle Length q30d  Obhx: FT  x1  ETOP x8, no D&C  Ectopic (2019) s/p methotrexate    Gynhx: H/o fibroids, conservative management. H/o abnormal pap smears s/p negative colposcopy. Denies history of ovarian cysts or STIs  Last Pap Smear unknown      Denies the following: constitutional symptoms, visual symptoms, cardiovascular symptoms, respiratory symptoms, GI symptoms, musculoskeletal symptoms, skin symptoms, neurologic symptoms, hematologic symptoms, allergic symptoms, psychiatric symptoms  Except any pertinent positives listed.     Home Medications: None      Allergies    amoxicillin (Unknown)          PAST MEDICAL HISTORY:  Fibroid, uterine  No significant past surgical history    PAST SURGICAL HISTORY:  No significant past surgical history      FAMILY HISTORY:  Denies significant family history    SOCIAL HISTORY: Denies cigarette use, alcohol use, or illicit drug use    Vital Signs Last 24 Hrs  T(F): 98.8 (2020 00:46), Max: 98.8 (2020 00:46)  HR: 95 (2020 00:46) (95 - 95)  BP: 150/70 (2020 00:46) (150/70 - 150/70)  RR: 17 (2020 00:46) (17 - 17)    Weight (kg): 108 (20 @ 00:46)    General Appearance - AAOx3, NAD  Heart - S1S2 regular rate and rhythm  Lung - CTA Bilaterally  Abdomen - Soft, nontender, nondistended, no rebound, no rigidity, no guarding, bowel sounds present    GYN/Pelvis:    Labia Majora - Normal  Labia Minora - Normal  Clitoris - Normal  Urethra - Normal  Vagina - 10cc of dark red blood in vagina  Cervix - 1cm dilated with clots at external cervical os, no active bleeding, no CMT    Uterus:  Size - 7wk sized, anteverted  Tenderness - None  Mass - None  Freely mobile    Adnexa:  Masses - None  Tenderness - None      Meds:   morphine  - Injectable 2 milliGRAM(s) IV Push Once      Weight (kg): 108 (20 @ 00:46)    LABS:                        8.3    6.43  )-----------( 247      ( 2020 01:40 )             26.8     HCG Quantitative, Serum: 6894.0 mIU/mL (20 @ 01:40)    ABO RH Interpretation: O POS (20 @ 01:40)  Antibody Screen: NEG (20 @ 01:40)        137  |  105  |  11  ----------------------------<  106<H>  4.4   |  23  |  0.7    Ca    8.8      2020 01:40    TPro  6.5  /  Alb  3.6  /  TBili  <0.2  /  DBili  x   /  AST  10  /  ALT  7   /  AlkPhos  84      PT/INR - ( 2020 01:40 )   PT: 11.80 sec;   INR: 1.03 ratio         PTT - ( 2020 01:40 )  PTT:24.2 sec        RADIOLOGY & ADDITIONAL STUDIES:      EXAM:  US TRANSVAGINAL         PROCEDURE DATE:  2020    INTERPRETATION:  CLINICAL HISTORY: Vaginal bleeding. Pregnancy.  COMPARISON: Pelvic sonogram May 25, 2019.  PROCEDURE: Transabdominal ultrasound of the pelvis was performed, including Doppler. Transvaginal exam was not performed as per patient's request.  LMP: Unknown.  FINDINGS:  UTERUS: The uterus is enlarged and heterogeneous in echotexture [id number] x 9.9 x 9.6 cm. There are multiple uterine fibroids with calcifications measuring up to 6.1 x 5.2 x 5.7 cm. The endometrial echo complex measures 1.3 cm.  RIGHT OVARY: Not visualized.  LEFT OVARY: measures 3.9 x 3.5 x 2.9 cm, and contains a 2.8 cm cyst. Doppler flow is demonstrated to the left ovary.   OTHER: No free fluid in the pelvis.  IMPRESSION:  No intrauterine pregnancy or extraovarian adnexal masses identified. Findings are compatible with pregnancy of unknown location for which differential considerations include early pregnancy, ectopic pregnancy and miscarriage. Follow-up with serial hCG and ultrasound in 7-10 days may be of benefit.   Multiple calcified uterine fibroids as above.  Nonvisualization of the right ovary. Chief Complaint: vaginal bleeding    HPI: 40yo  with LMP in April now presenting for vaginal bleeding with clots beginning yesterday.  She said bleeding began suddenly yesterday PM, with golfball-sized clot passage.  Bleeding is now less severe, using about 1-2 pads overnight.  Denies CP, SOB, palpitations, dizziness.  Also had cramping yesterday, in the lower abdomen, nonradiating, did not attempt pain relief with OTC medication.  Received morphine x1 in the ED. Pain is now resolved.  Otherwise denies headache, dysuria, hematuria, fevers, chills, N/V, diarrhea, constipation.  Last PO intake yesterday morning, last BM yesterday morning, last intercourse 2 months ago.      Ob/Gyn History:                   LMP April                   Cycle Length q30d  Obhx: FT  x1  ETOP x8, no D&C  Ectopic (2019) s/p methotrexate    Gynhx: H/o fibroids, conservative management. H/o abnormal pap smears s/p negative colposcopy. Denies history of ovarian cysts or STIs  Last Pap Smear unknown      Denies the following: constitutional symptoms, visual symptoms, cardiovascular symptoms, respiratory symptoms, GI symptoms, musculoskeletal symptoms, skin symptoms, neurologic symptoms, hematologic symptoms, allergic symptoms, psychiatric symptoms  Except any pertinent positives listed.     Home Medications: None      Allergies    amoxicillin (Unknown)          PAST MEDICAL HISTORY:  Fibroid, uterine  No significant past surgical history    PAST SURGICAL HISTORY:  No significant past surgical history      FAMILY HISTORY:  Denies significant family history    SOCIAL HISTORY: Denies cigarette use, alcohol use, or illicit drug use    Vital Signs Last 24 Hrs  T(F): 98.8 (2020 00:46), Max: 98.8 (2020 00:46)  HR: 95 (2020 00:46) (95 - 95)  BP: 150/70 (2020 00:46) (150/70 - 150/70)  RR: 17 (2020 00:46) (17 - 17)    Weight (kg): 108 (20 @ 00:46)    General Appearance - AAOx3, NAD  Heart - S1S2 regular rate and rhythm  Lung - CTA Bilaterally  Abdomen - Soft, nontender, nondistended, no rebound, no rigidity, no guarding, bowel sounds present    GYN/Pelvis:    Labia Majora - Normal  Labia Minora - Normal  Clitoris - Normal  Urethra - Normal  Vagina - 10cc of dark red blood in vagina  Cervix - 1cm dilated with clots at external cervical os, no heavy active bleeding, no CMT    Uterus:  Size - 7wk sized, anteverted  Tenderness - None  Mass - None  Freely mobile    Adnexa:  Masses - None  Tenderness - None      Meds:   morphine  - Injectable 2 milliGRAM(s) IV Push Once      Weight (kg): 108 (20 @ 00:46)    LABS:                        8.3    6.43  )-----------( 247      ( 2020 01:40 )             26.8     HCG Quantitative, Serum: 6894.0 mIU/mL (20 @ 01:40)    ABO RH Interpretation: O POS (20 @ 01:40)  Antibody Screen: NEG (20 @ 01:40)        137  |  105  |  11  ----------------------------<  106<H>  4.4   |  23  |  0.7    Ca    8.8      2020 01:40    TPro  6.5  /  Alb  3.6  /  TBili  <0.2  /  DBili  x   /  AST  10  /  ALT  7   /  AlkPhos  84      PT/INR - ( 2020 01:40 )   PT: 11.80 sec;   INR: 1.03 ratio         PTT - ( 2020 01:40 )  PTT:24.2 sec        RADIOLOGY & ADDITIONAL STUDIES:      EXAM:  US TRANSVAGINAL         PROCEDURE DATE:  2020    INTERPRETATION:  CLINICAL HISTORY: Vaginal bleeding. Pregnancy.  COMPARISON: Pelvic sonogram May 25, 2019.  PROCEDURE: Transabdominal ultrasound of the pelvis was performed, including Doppler. Transvaginal exam was not performed as per patient's request.  LMP: Unknown.  FINDINGS:  UTERUS: The uterus is enlarged and heterogeneous in echotexture [id number] x 9.9 x 9.6 cm. There are multiple uterine fibroids with calcifications measuring up to 6.1 x 5.2 x 5.7 cm. The endometrial echo complex measures 1.3 cm.  RIGHT OVARY: Not visualized.  LEFT OVARY: measures 3.9 x 3.5 x 2.9 cm, and contains a 2.8 cm cyst. Doppler flow is demonstrated to the left ovary.   OTHER: No free fluid in the pelvis.  IMPRESSION:  No intrauterine pregnancy or extraovarian adnexal masses identified. Findings are compatible with pregnancy of unknown location for which differential considerations include early pregnancy, ectopic pregnancy and miscarriage. Follow-up with serial hCG and ultrasound in 7-10 days may be of benefit.   Multiple calcified uterine fibroids as above.  Nonvisualization of the right ovary. Chief Complaint: vaginal bleeding    HPI: 40yo  with LMP in April now presenting for vaginal bleeding with clots beginning yesterday.  She said bleeding began suddenly yesterday PM, with golfball-sized clot passage.  Bleeding is now less severe, using about 1-2 pads overnight.  Denies CP, SOB, palpitations, dizziness.  Also had cramping yesterday, in the lower abdomen, nonradiating, did not attempt pain relief with OTC medication.  Received morphine x1 in the ED. Pain is now resolved.  Otherwise denies headache, dysuria, hematuria, fevers, chills, N/V, diarrhea, constipation.  Last PO intake yesterday morning, last BM yesterday morning, last intercourse 2 months ago.      Ob/Gyn History:                   LMP April                   Cycle Length q30d  Obhx: FT  x1  ETOP x5, SAB x3, no D&C  Ectopic (2019) s/p methotrexate    Gynhx: H/o fibroids, conservative management. H/o abnormal pap smears s/p negative colposcopy. Denies history of ovarian cysts or STIs  Last Pap Smear unknown      Denies the following: constitutional symptoms, visual symptoms, cardiovascular symptoms, respiratory symptoms, GI symptoms, musculoskeletal symptoms, skin symptoms, neurologic symptoms, hematologic symptoms, allergic symptoms, psychiatric symptoms  Except any pertinent positives listed.     Home Medications: None      Allergies    amoxicillin (Unknown)          PAST MEDICAL HISTORY:  Fibroid, uterine  No significant past surgical history    PAST SURGICAL HISTORY:  No significant past surgical history      FAMILY HISTORY:  Denies significant family history    SOCIAL HISTORY: Denies cigarette use, alcohol use, or illicit drug use    Vital Signs Last 24 Hrs  T(F): 98.8 (2020 00:46), Max: 98.8 (2020 00:46)  HR: 95 (2020 00:46) (95 - 95)  BP: 150/70 (2020 00:46) (150/70 - 150/70)  RR: 17 (2020 00:46) (17 - 17)    Weight (kg): 108 (20 @ 00:46)    General Appearance - AAOx3, NAD  Heart - S1S2 regular rate and rhythm  Lung - CTA Bilaterally  Abdomen - Soft, nontender, nondistended, no rebound, no rigidity, no guarding, bowel sounds present    GYN/Pelvis:    Labia Majora - Normal  Labia Minora - Normal  Clitoris - Normal  Urethra - Normal  Vagina - 10cc of dark red blood in vagina  Cervix - 1cm dilated with clots at external cervical os, no heavy active bleeding, no CMT    Uterus:  Size - 7wk sized, anteverted  Tenderness - None  Mass - None  Freely mobile    Adnexa:  Masses - None  Tenderness - None      Meds:   morphine  - Injectable 2 milliGRAM(s) IV Push Once      Weight (kg): 108 (20 @ 00:46)    LABS:                        8.3    6.43  )-----------( 247      ( 2020 01:40 )             26.8     HCG Quantitative, Serum: 6894.0 mIU/mL (20 @ 01:40)    ABO RH Interpretation: O POS (20 @ 01:40)  Antibody Screen: NEG (20 @ 01:40)        137  |  105  |  11  ----------------------------<  106<H>  4.4   |  23  |  0.7    Ca    8.8      2020 01:40    TPro  6.5  /  Alb  3.6  /  TBili  <0.2  /  DBili  x   /  AST  10  /  ALT  7   /  AlkPhos  84      PT/INR - ( 2020 01:40 )   PT: 11.80 sec;   INR: 1.03 ratio         PTT - ( 2020 01:40 )  PTT:24.2 sec        RADIOLOGY & ADDITIONAL STUDIES:      EXAM:  US TRANSVAGINAL         PROCEDURE DATE:  2020    INTERPRETATION:  CLINICAL HISTORY: Vaginal bleeding. Pregnancy.  COMPARISON: Pelvic sonogram May 25, 2019.  PROCEDURE: Transabdominal ultrasound of the pelvis was performed, including Doppler. Transvaginal exam was not performed as per patient's request.  LMP: Unknown.  FINDINGS:  UTERUS: The uterus is enlarged and heterogeneous in echotexture [id number] x 9.9 x 9.6 cm. There are multiple uterine fibroids with calcifications measuring up to 6.1 x 5.2 x 5.7 cm. The endometrial echo complex measures 1.3 cm.  RIGHT OVARY: Not visualized.  LEFT OVARY: measures 3.9 x 3.5 x 2.9 cm, and contains a 2.8 cm cyst. Doppler flow is demonstrated to the left ovary.   OTHER: No free fluid in the pelvis.  IMPRESSION:  No intrauterine pregnancy or extraovarian adnexal masses identified. Findings are compatible with pregnancy of unknown location for which differential considerations include early pregnancy, ectopic pregnancy and miscarriage. Follow-up with serial hCG and ultrasound in 7-10 days may be of benefit.   Multiple calcified uterine fibroids as above.  Nonvisualization of the right ovary. Chief Complaint: vaginal bleeding    HPI: 38yo  with LMP in April now presenting for vaginal bleeding with clots beginning yesterday.  She said bleeding began suddenly yesterday PM, with golfball-sized clot passage.  Bleeding is now less severe, using about 1-2 pads overnight.  Denies CP, SOB, palpitations, dizziness.  Also had cramping yesterday, in the lower abdomen, nonradiating, did not attempt pain relief with OTC medication.  Received morphine x1 in the ED. Pain is now resolved.  Otherwise denies headache, dysuria, hematuria, fevers, chills, N/V, diarrhea, constipation.  Last PO intake yesterday morning, last BM yesterday morning, last intercourse 2 months ago.      Ob/Gyn History:                   LMP April                   Cycle Length q30d  Obhx: FT  x1  ETOP x5, SAB x3, no D&C  Ectopic (2019) s/p methotrexate    Gynhx: H/o fibroids, conservative management. H/o abnormal pap smears s/p negative colposcopy. Denies history of ovarian cysts or STIs  Last Pap Smear unknown      Denies the following: constitutional symptoms, visual symptoms, cardiovascular symptoms, respiratory symptoms, GI symptoms, musculoskeletal symptoms, skin symptoms, neurologic symptoms, hematologic symptoms, allergic symptoms, psychiatric symptoms  Except any pertinent positives listed.     Home Medications: None      Allergies    amoxicillin (Unknown)          PAST MEDICAL HISTORY:  Fibroid, uterine  No significant past surgical history    PAST SURGICAL HISTORY:  No significant past surgical history      FAMILY HISTORY:  Denies significant family history    SOCIAL HISTORY: Denies cigarette use, alcohol use, or illicit drug use    Vital Signs Last 24 Hrs  T(F): 98.8 (2020 00:46), Max: 98.8 (2020 00:46)  HR: 95 (2020 00:46) (95 - 95)  BP: 150/70 (2020 00:46) (150/70 - 150/70)  RR: 17 (2020 00:46) (17 - 17)    Weight (kg): 108 (20 @ 00:46)    General Appearance - AAOx3, NAD  Heart - S1S2 regular rate and rhythm  Lung - CTA Bilaterally  Abdomen - Soft, nontender, nondistended, no rebound, no rigidity, no guarding, bowel sounds present    GYN/Pelvis:    Labia Majora - Normal  Labia Minora - Normal  Clitoris - Normal  Urethra - Normal  Vagina - 10cc of dark red blood in vagina  Cervix - 1cm dilated with clots at external cervical os, no heavy active bleeding, no CMT    Uterus:  Size - 7wk sized, anteverted  Tenderness - None  Mass - None  Freely mobile    Adnexa:  Masses - None  Tenderness - None      Meds:   morphine  - Injectable 2 milliGRAM(s) IV Push Once      Weight (kg): 108 (20 @ 00:46)    LABS:                        8.3    6.43  )-----------( 247      ( 2020 01:40 )             26.8     HCG Quantitative, Serum: 6894.0 mIU/mL (20 @ 01:40)    ABO RH Interpretation: O POS (20 @ 01:40)  Antibody Screen: NEG (20 @ 01:40)        137  |  105  |  11  ----------------------------<  106<H>  4.4   |  23  |  0.7    Ca    8.8      2020 01:40    TPro  6.5  /  Alb  3.6  /  TBili  <0.2  /  DBili  x   /  AST  10  /  ALT  7   /  AlkPhos  84      PT/INR - ( 2020 01:40 )   PT: 11.80 sec;   INR: 1.03 ratio         PTT - ( 2020 01:40 )  PTT:24.2 sec        RADIOLOGY & ADDITIONAL STUDIES:      PROCEDURE DATE:  2020      INTERPRETATION:  CLINICAL HISTORY: Vaginal bleeding.   COMPARISON: Earlier in the day  PROCEDURE: Transvaginal and transabdominal ultrasound of the pelvis was performed, including Doppler.  LMP: Unsure   FINDINGS:  UTERUS: 12.7 x 8.4 x 8.0 cm and is heterogeneous in echotexture. Multiple fibroids are identified with calcifications measuring up to 6.1 cm. The endometrial echo complex measures 1.3 cm, which is within normal limits for a premenopausal patient. No evidence of intrauterine pregnancy on this exam  Heterogeneous material is noted within the endocervical canal, may represent blood products  RIGHT OVARY: Not visualized due to bowel gas  LEFT OVARY: Not visualized due to bowel gas  OTHER: No free fluid in the pelvis.  IMPRESSION:  No evidence of intrauterine pregnancy on this examination. Findings may be related to imaging at too early of a gestational age, ectopic pregnancy, or miscarriage.  Follow-up serial beta-hCG and one week ultrasound is recommended  Heterogeneous material is noted within the endocervical canal, may represent blood products.  Fibroid uterus  Ovaries are obscured by bowel gas    EXAM:  US TRANSVAGINAL         PROCEDURE DATE:  2020    INTERPRETATION:  CLINICAL HISTORY: Vaginal bleeding. Pregnancy.  COMPARISON: Pelvic sonogram May 25, 2019.  PROCEDURE: Transabdominal ultrasound of the pelvis was performed, including Doppler. Transvaginal exam was not performed as per patient's request.  LMP: Unknown.  FINDINGS:  UTERUS: The uterus is enlarged and heterogeneous in echotexture [id number] x 9.9 x 9.6 cm. There are multiple uterine fibroids with calcifications measuring up to 6.1 x 5.2 x 5.7 cm. The endometrial echo complex measures 1.3 cm.  RIGHT OVARY: Not visualized.  LEFT OVARY: measures 3.9 x 3.5 x 2.9 cm, and contains a 2.8 cm cyst. Doppler flow is demonstrated to the left ovary.   OTHER: No free fluid in the pelvis.  IMPRESSION:  No intrauterine pregnancy or extraovarian adnexal masses identified. Findings are compatible with pregnancy of unknown location for which differential considerations include early pregnancy, ectopic pregnancy and miscarriage. Follow-up with serial hCG and ultrasound in 7-10 days may be of benefit.   Multiple calcified uterine fibroids as above.  Nonvisualization of the right ovary.

## 2020-07-13 NOTE — ED PROVIDER NOTE - CLINICAL SUMMARY MEDICAL DECISION MAKING FREE TEXT BOX
evaluated for vaginal bleeding, us performed, ob consulted, and patient will fu in 48 hrs for repeat hcg and ultrasound

## 2020-07-13 NOTE — ED PROVIDER NOTE - NSFOLLOWUPCLINICS_GEN_ALL_ED_FT
[facility]  OB/GYN  [address] [place] [address]  Phone: [phone]  Fax:   Follow Up Time: 1-3 Days

## 2020-07-13 NOTE — ED ADULT NURSE NOTE - OBJECTIVE STATEMENT
Pt presents to ED c/o vaginal bleeding w/ clots since yesterday. Pt stated was nauseous and had no appetite a few days ago as well. Pt is awake alert and not in any distress.

## 2020-07-13 NOTE — ED ADULT TRIAGE NOTE - CHIEF COMPLAINT QUOTE
pt with heavy vaginal bleeding with clots that started yesterday  pt states going through 1-2 pads per hour

## 2020-07-13 NOTE — ED PROVIDER NOTE - NSFOLLOWUPINSTRUCTIONS_ED_ALL_ED_FT
Threatened Miscarriage    A threatened miscarriage is the term for vaginal bleeding during your first 20 weeks of pregnancy but the pregnancy has not ended. If you have vaginal bleeding during this time, your health care provider will do tests to check if you are still pregnant. If the tests show you are still pregnant and the developing baby (fetus) inside your womb (uterus) is still growing, your condition is considered a threatened miscarriage. A threatened miscarriage does not mean your pregnancy will end, but it does increase the risk of losing your pregnancy. It is important to have close follow up with your obstetrician.    SEEK IMMEDIATE MEDICAL CARE IF YOU HAVE ANY OF THE FOLLOWING SYMPTOMS: heavy vaginal bleeding, severe low back or abdominal cramps, fever/chills, or lightheadedness/dizziness. *** Please go to OBGYN clinic in 48 hours for repeat blood test and ultrasound ***    Miscarriage      A miscarriage is the loss of an unborn baby (fetus) before the 20th week of pregnancy. Most miscarriages happen during the first 3 months of pregnancy. Sometimes, a miscarriage can happen before a woman knows that she is pregnant.    Having a miscarriage can be an emotional experience. If you have had a miscarriage, talk with your health care provider about any questions you may have about miscarrying, the grieving process, and your plans for future pregnancy.      What are the causes?    A miscarriage may be caused by:    Problems with the genes or chromosomes of the fetus. These problems make it impossible for the baby to develop normally. They are often the result of random errors that occur early in the development of the baby, and are not passed from parent to child (not inherited).  Infection of the cervix or uterus.  Conditions that affect hormone balance in the body.  Problems with the cervix, such as the cervix opening and thinning before pregnancy is at term (cervical insufficiency).    Problems with the uterus. These may include:    A uterus with an abnormal shape.  Fibroids in the uterus.  Congenital abnormalities. These are problems that were present at birth.  Certain medical conditions.  Smoking, drinking alcohol, or using drugs.  Injury (trauma).  In many cases, the cause of a miscarriage is not known.      What are the signs or symptoms?    Symptoms of this condition include:    Vaginal bleeding or spotting, with or without cramps or pain.  Pain or cramping in the abdomen or lower back.  Passing fluid, tissue, or blood clots from the vagina.      How is this diagnosed?    This condition may be diagnosed based on:    A physical exam.  Ultrasound.  Blood tests.  Urine tests.      How is this treated?    Treatment for a miscarriage is sometimes not necessary if you naturally pass all the tissue that was in your uterus. If necessary, this condition may be treated with:    Dilation and curettage (D&C). This is a procedure in which the cervix is stretched open and the lining of the uterus (endometrium) is scraped. This is done only if tissue from the fetus or placenta remains in the body (incomplete miscarriage).    Medicines, such as:    Antibiotic medicine, to treat infection.  Medicine to help the body pass any remaining tissue.  Medicine to reduce (contract) the size of the uterus. These medicines may be given if you have a lot of bleeding.  If you have Rh negative blood and your baby was Rh positive, you will need a shot of a medicine called Rh immunoglobulin to protect your future babies from Rh blood problems. "Rh-negative" and "Rh-positive" refer to whether or not the blood has a specific protein found on the surface of red blood cells (Rh factor).      Follow these instructions at home:      Medicines     Take over-the-counter and prescription medicines only as told by your health care provider.  If you were prescribed antibiotic medicine, take it as told by your health care provider. Do not stop taking the antibiotic even if you start to feel better.  Do not take NSAIDs, such as aspirin and ibuprofen, unless they are approved by your health care provider. These medicines can cause bleeding.      Activity     Rest as directed. Ask your health care provider what activities are safe for you.  Have someone help with home and family responsibilities during this time.      General instructions     Keep track of the number of sanitary pads you use each day and how soaked (saturated) they are. Write down this information.  Monitor the amount of tissue or blood clots that you pass from your vagina. Save any large amounts of tissue for your health care provider to examine.  Do not use tampons, douche, or have sex until your health care provider approves.  To help you and your partner with the process of grieving, talk with your health care provider or seek counseling.  When you are ready, meet with your health care provider to discuss any important steps you should take for your health. Also, discuss steps you should take to have a healthy pregnancy in the future.  Keep all follow-up visits as told by your health care provider. This is important.      Where to find more information    The American Congress of Obstetricians and Gynecologists: www.acog.orgU.S. Department of Health and Human Services Office of Women’s Health: www.womenshealth.gov      Contact a health care provider if:    You have a fever or chills.  You have a foul smelling vaginal discharge.  You have more bleeding instead of less.      Get help right away if:    You have severe cramps or pain in your back or abdomen.  You pass blood clots or tissue from your vagina that is walnut-sized or larger.  You soak more than 1 regular sanitary pad in an hour.  You become light-headed or weak.  You pass out.  You have feelings of sadness that take over your thoughts, or you have thoughts of hurting yourself.      Summary    Most miscarriages happen in the first 3 months of pregnancy. Sometimes miscarriage happens before a woman even knows that she is pregnant.  Follow your health care provider's instruction for home care. Keep all follow-up appointments.  To help you and your partner with the process of grieving, talk with your health care provider or seek counseling.    This information is not intended to replace advice given to you by your health care provider. Make sure you discuss any questions you have with your health care provider.

## 2020-07-13 NOTE — ED PROVIDER NOTE - PROGRESS NOTE DETAILS
GYN resident Mary aware of consultation GYN resident Raymond called, patient refused transvaginal sono. he was reviewing the images and couldn't completely evaluate her uterus. request to have transvaginal sono to better evaluate her uterus. endorsed to Dr. Villar to f/u transvaginal sono and gyn eval. TC: Pt signed out to me by CHASE Landon. 38 yo F with hx of uterine fibroid*** Placed GYN c/s. TC: Pt signed out to me by CHASE Landon. 40 yo F with hx of uterine fibroid*** Spoke with GYN who will call back with final recs after discussion with attending. TC: Pt signed out to me by CHASE Landon. 40 yo F with hx of uterine fibroid,  who presents with vaginal bleeding x 2 days. No fever, chills, cp, sob, dizziness, abd pain. Labs notable for hgb 8.3 (baseline), beta quant 6800s, ua with large blood. TVUS shows no IUP, heterogeneous material in endocervical cavity. Spoke with GYN who will call back with final recs after discussion with attending. TC: Pt signed out to me by CHASE Landon. 40 yo F with hx of uterine fibroid,  who presents with vaginal bleeding x 2 days. No fever, chills, cp, sob, dizziness, abd pain. Labs notable for hgb 8.3 (baseline), beta quant 6800s, Rh+, ua with large blood. TVUS shows no IUP, heterogeneous material in endocervical cavity. Spoke with GYN who will call back with final recs after discussion with attending. TC: Spoke with GYN who discussed case with attending, they stated likely miscarriage but pt will have to f/u in 48 hrs for repeat beta quant and US. Recommended rx for iron but pt declined new rx, states she already has iron supps at home. Strict ED return precautions given. Pt verbalized understanding and was agreeable with plan.

## 2020-07-13 NOTE — CONSULT NOTE ADULT - ASSESSMENT
38yo  with LMP in April,  pos, with vaginal bleeding likely SAB r/o normal IUP vs. pregnancy, clinically and hemodynamically stable  -incomplete 40yo  with LMP in April,  pos, with vaginal bleeding likely SAB r/o normal IUP vs. ectopic pregnancy, clinically and hemodynamically stable  -no acute gyn intervention at this time  -Due to decrease in vaginal bleeding and resolution of pain, patient opts for expectant management at this time  -repeat bhcg in 48hrs (7/15) at [address]  -strict ectopic and bleeding precautions  -f/u pathology  -dispo per ED    Dr. Morrow and Dr. Urrutia aware. 40yo  with LMP in April, Rh pos, with vaginal bleeding likely SAB r/o normal IUP vs. ectopic pregnancy, clinically and hemodynamically stable  -no acute gyn intervention at this time  -repeat bhcg in 48hrs (7/15) at [address]  -strict ectopic and bleeding precautions  -f/u pathology  -dispo per ED    Dr. Morrow and Dr. Urrutia aware.

## 2020-07-13 NOTE — ED PROVIDER NOTE - NS ED ROS FT
Constitutional: no fever, chills, no recent weight loss, change in appetite or malaise  Eyes: no redness/discharge/pain/vision changes  ENT: no rhinorrhea/ear pain/sore throat  Cardiac: No chest pain, SOB or edema.  Respiratory: No cough or respiratory distress  GI: No nausea, vomiting, diarrhea or abdominal pain.  : see HPI. No dysuria, frequency, urgency or hematuria  MS: no pain to back or extremities, no loss of ROM, no weakness  Neuro: No headache or weakness. No LOC.  Skin: No skin rash.  Endocrine: No history of thyroid disease or diabetes.

## 2020-07-13 NOTE — CONSULT NOTE ADULT - ATTENDING COMMENTS
38yo  with likely complete spontaneous , no heavy active bleeding at the moment, stable    - repeat bhcg in 48h to confirm drop  - start iron pills, encourage oral hydration  - bleeding and ectopic precautions discussed 40yo  with likely complete spontaneous , no heavy active bleeding at the moment, stable    - repeat bhcg in 48h to confirm drop  - f/u pathology of likely POCs removed from cervical os  - start iron pills, encourage oral hydration  - bleeding and ectopic precautions discussed

## 2020-07-13 NOTE — ED PROVIDER NOTE - PATIENT PORTAL LINK FT
You can access the FollowMyHealth Patient Portal offered by [facility] [place] by registering at the following website: [email or web address]. By joining [employer]’s FollowMyHealth portal, you will also be able to view your health information using other applications (apps) compatible with our system.

## 2020-07-13 NOTE — ED ADULT NURSE REASSESSMENT NOTE - NS ED NURSE REASSESS COMMENT FT1
pt assessed at bedside, a&o x4, in NAD at this time, no c/o pain/discomfort, comfort measures offered/maintained, urine collected and sent to lab

## 2020-07-14 LAB
CULTURE RESULTS: SIGNIFICANT CHANGE UP
SPECIMEN SOURCE: SIGNIFICANT CHANGE UP

## 2020-07-15 LAB — SURGICAL PATHOLOGY STUDY: SIGNIFICANT CHANGE UP

## 2023-06-05 NOTE — ED ADULT NURSE NOTE - NSFALLRSKINDICATORS_ED_ALL_ED
Occupational Therapy  Daily Treatment     Patient Name: Bharath Diaz  Age:  44 y.o., Sex:  male  Medical Record #: [id number]  Today's Date: 6/5/2023     Precautions  Precautions: Fall Risk, TLSO (Thoracolumbosacral orthosis), Spinal / Back Precautions   Comments: possible R partial infraspinatus tear; R rib 1,8,9 fxs; T11-L1 posterior fusion; L3 AIS B         Subjective    Pt up in w/c upon arrival, agreeable to OT session.      Objective     06/05/23 1031   OT Charge Group   OT Self Care / ADL (Units) 2   OT Neuromuscular Re-education / Balance (Units) 2   OT Total Time Spent   OT Individual Total Time Spent (Mins) 60   Functional Level of Assist   Lower Body Dressing Minimal Assist  (don/doff socks and AFO/shoes in seated figure four. VCs to problem solve positionign of AFO/shoe to effectively slide foot in, Ambar to position toes all the way into shoe. Education provided on regular skin checks after each use)   Lower Body Dressing Description Reacher;Increased time;Initial preparation for task;Set-up of equipment;Supervision for safety;Verbal cueing   Balance   Skilled Intervention Postural Facilitation;Sequencing;Tactile Cuing;Verbal Cuing   Comments In // bars with one UE support on bar, pt completed four rounds of bean bag toss alternating UE support in standing with CGA. Seated rest break between each set. Pt then completed 2x10 alternating cone toe taps with BUE support on bar and CGA   Interdisciplinary Plan of Care Collaboration   Patient Position at End of Therapy Seated;Call Light within Reach;Tray Table within Reach;Phone within Reach     Education provided on completing skin checks after every AFO use. Pt had redness on the dorsal 1st metatarsal bilaterally after wearing for ~40 min. Educated pt to check skin again in 20 minutes to monitor for changes, and to alert nursing if redness does not subside.     Assessment    Pt tolerated OT session well. Needs increased time and verbal cues to sequencing  donning/doffing AFOs/shoes in seated figure four. No LOB with balance activities but relies on one UE support to maintain balance. Min cues for LE motor control and motor planning.     Strengths: Able to follow instructions, Alert and oriented, Effective communication skills, Good carryover of learning, Good insight into deficits/needs, Independent prior level of function, Making steady progress towards goals, Manages pain appropriately, Motivated for self care and independence, Pleasant and cooperative, Supportive family, Willingly participates in therapeutic activities  Barriers: Bladder incontinence, Bladder retention, Decreased endurance, Fatigue, Generalized weakness, Impaired activity tolerance, Impaired balance, Limited mobility, Pain    Plan    Refer to primary OT POC    Passport items to be completed:  Perform bathroom transfers, complete dressing, complete feeding, get ready for the day, prepare a simple meal, participate in household tasks, adapt home for safety needs, demonstrate home exercise program, complete caregiver training     Occupational Therapy Goals (Active)       Problem: Bathing       Dates: Start:  06/01/23         Goal: STG-Within one week, patient will bathe at CGA level using DME/AE PRN.       Dates: Start:  06/01/23               Problem: Dressing       Dates: Start:  06/01/23         Goal: STG-Within one week, patient will dress UB with clothing retrieval at Mod I level, including TLSO don/doff.       Dates: Start:  06/01/23               Problem: Functional Transfers       Dates: Start:  06/01/23         Goal: STG-Within one week, patient will transfer to toilet at CGA x1 using DME/AE PRN.       Dates: Start:  06/01/23            Goal: STG-Within one week, patient will transfer to tub/shower using tub transfer bench at [facility] level using DME PRN.       Dates: Start:  06/01/23               Problem: IADL's       Dates: Start:  06/01/23         Goal: STG-Within one week, patient will  access kitchen area at w/c level with SPV using AE PRN.       Dates: Start:  06/01/23               Problem: OT Long Term Goals       Dates: Start:  05/24/23         Goal: LTG-By discharge, patient will complete basic self care tasks at Min A x1-Mod I level using DME/AE PRN.       Dates: Start:  05/24/23    Expected End:  06/08/23            Goal: LTG-By discharge, patient will perform bathroom transfers at Min A x1-Mod I level using DME/AE PRN.       Dates: Start:  05/24/23    Expected End:  06/08/23            Goal: LTG-By discharge, patient will complete basic home management at Min A x1-Mod I level using DME/AE PRN.       Dates: Start:  05/24/23    Expected End:  06/08/23               Problem: Toileting       Dates: Start:  06/01/23         Goal: STG-Within one week, patient will complete toileting tasks at Max A x1 using DME/AE PRN.       Dates: Start:  06/01/23               no

## 2024-09-03 PROBLEM — D25.9 LEIOMYOMA OF UTERUS, UNSPECIFIED: Chronic | Status: ACTIVE | Noted: 2020-07-13

## 2024-09-17 DIAGNOSIS — E66.01 MORBID (SEVERE) OBESITY DUE TO EXCESS CALORIES: ICD-10-CM

## 2024-10-02 ENCOUNTER — APPOINTMENT (OUTPATIENT)
Dept: SURGERY | Facility: CLINIC | Age: 44
End: 2024-10-02

## 2025-02-12 ENCOUNTER — APPOINTMENT (OUTPATIENT)
Dept: SURGERY | Facility: CLINIC | Age: 45
End: 2025-02-12
Payer: COMMERCIAL

## 2025-02-12 VITALS
DIASTOLIC BLOOD PRESSURE: 90 MMHG | TEMPERATURE: 97 F | BODY MASS INDEX: 39.76 KG/M2 | HEART RATE: 80 BPM | HEIGHT: 71 IN | OXYGEN SATURATION: 97 % | SYSTOLIC BLOOD PRESSURE: 142 MMHG | WEIGHT: 284 LBS

## 2025-02-12 DIAGNOSIS — E66.813 OBESITY, CLASS 3: ICD-10-CM

## 2025-02-12 PROCEDURE — 99204 OFFICE O/P NEW MOD 45 MIN: CPT

## 2025-02-15 PROBLEM — E66.813 CLASS 3 OBESITY: Status: ACTIVE | Noted: 2024-09-17

## 2025-02-21 RX ORDER — CHROMIUM 200 MCG
TABLET ORAL DAILY
Refills: 0 | Status: ACTIVE | COMMUNITY

## 2025-02-21 RX ORDER — MAGNESIUM OXIDE/MAG AA CHELATE 300 MG
CAPSULE ORAL DAILY
Refills: 0 | Status: ACTIVE | COMMUNITY

## 2025-02-26 ENCOUNTER — TRANSCRIPTION ENCOUNTER (OUTPATIENT)
Age: 45
End: 2025-02-26

## 2025-02-26 ENCOUNTER — APPOINTMENT (OUTPATIENT)
Dept: PSYCHIATRY | Facility: CLINIC | Age: 45
End: 2025-02-26

## 2025-02-26 LAB — VIT B12 SERPL-MCNC: 507 PG/ML

## 2025-03-06 ENCOUNTER — APPOINTMENT (OUTPATIENT)
Dept: OBGYN | Facility: CLINIC | Age: 45
End: 2025-03-06
Payer: COMMERCIAL

## 2025-03-06 ENCOUNTER — NON-APPOINTMENT (OUTPATIENT)
Age: 45
End: 2025-03-06

## 2025-03-06 VITALS
BODY MASS INDEX: 40.18 KG/M2 | SYSTOLIC BLOOD PRESSURE: 132 MMHG | WEIGHT: 287 LBS | HEIGHT: 71 IN | DIASTOLIC BLOOD PRESSURE: 80 MMHG | HEART RATE: 81 BPM

## 2025-03-06 DIAGNOSIS — D21.9 BENIGN NEOPLASM OF CONNECTIVE AND OTHER SOFT TISSUE, UNSPECIFIED: ICD-10-CM

## 2025-03-06 DIAGNOSIS — Z00.00 ENCOUNTER FOR GENERAL ADULT MEDICAL EXAMINATION W/OUT ABNORMAL FINDINGS: ICD-10-CM

## 2025-03-06 PROBLEM — Z63.5 DIVORCED: Status: ACTIVE | Noted: 2025-03-06

## 2025-03-06 PROCEDURE — ZZZZZ: CPT

## 2025-03-06 PROCEDURE — 99386 PREV VISIT NEW AGE 40-64: CPT

## 2025-03-07 ENCOUNTER — APPOINTMENT (OUTPATIENT)
Dept: SURGERY | Facility: CLINIC | Age: 45
End: 2025-03-07
Payer: COMMERCIAL

## 2025-03-07 PROCEDURE — 97803 MED NUTRITION INDIV SUBSEQ: CPT | Mod: 95

## 2025-03-10 ENCOUNTER — NON-APPOINTMENT (OUTPATIENT)
Age: 45
End: 2025-03-10

## 2025-03-10 ENCOUNTER — APPOINTMENT (OUTPATIENT)
Dept: PSYCHIATRY | Facility: CLINIC | Age: 45
End: 2025-03-10
Payer: COMMERCIAL

## 2025-03-10 DIAGNOSIS — Z78.9 OTHER SPECIFIED HEALTH STATUS: ICD-10-CM

## 2025-03-10 DIAGNOSIS — Z63.5 DISRUPTION OF FAMILY BY SEPARATION AND DIVORCE: ICD-10-CM

## 2025-03-10 PROCEDURE — 90791 PSYCH DIAGNOSTIC EVALUATION: CPT | Mod: 95

## 2025-03-10 SDOH — SOCIAL STABILITY - SOCIAL INSECURITY: DISRUPTION OF FAMILY BY SEPARATION AND DIVORCE: Z63.5

## 2025-03-11 LAB — HPV HIGH+LOW RISK DNA PNL CVX: NOT DETECTED

## 2025-03-13 VITALS — HEIGHT: 71 IN | BODY MASS INDEX: 39.9 KG/M2 | WEIGHT: 285 LBS

## 2025-03-14 LAB — CYTOLOGY CVX/VAG DOC THIN PREP: NORMAL

## 2025-04-01 ENCOUNTER — APPOINTMENT (OUTPATIENT)
Dept: SURGERY | Facility: CLINIC | Age: 45
End: 2025-04-01

## 2025-04-24 ENCOUNTER — TRANSCRIPTION ENCOUNTER (OUTPATIENT)
Age: 45
End: 2025-04-24

## 2025-04-24 ENCOUNTER — RESULT REVIEW (OUTPATIENT)
Age: 45
End: 2025-04-24

## 2025-04-24 ENCOUNTER — OUTPATIENT (OUTPATIENT)
Dept: OUTPATIENT SERVICES | Facility: HOSPITAL | Age: 45
LOS: 1 days | Discharge: HOME | End: 2025-04-24
Payer: COMMERCIAL

## 2025-04-24 VITALS
RESPIRATION RATE: 14 BRPM | OXYGEN SATURATION: 100 % | SYSTOLIC BLOOD PRESSURE: 164 MMHG | DIASTOLIC BLOOD PRESSURE: 91 MMHG | HEART RATE: 71 BPM

## 2025-04-24 VITALS
TEMPERATURE: 98 F | RESPIRATION RATE: 17 BRPM | SYSTOLIC BLOOD PRESSURE: 149 MMHG | WEIGHT: 283.96 LBS | HEART RATE: 65 BPM | OXYGEN SATURATION: 99 % | HEIGHT: 71 IN | DIASTOLIC BLOOD PRESSURE: 88 MMHG

## 2025-04-24 DIAGNOSIS — E66.01 MORBID (SEVERE) OBESITY DUE TO EXCESS CALORIES: ICD-10-CM

## 2025-04-24 PROCEDURE — 81025 URINE PREGNANCY TEST: CPT

## 2025-04-24 PROCEDURE — 88312 SPECIAL STAINS GROUP 1: CPT

## 2025-04-24 PROCEDURE — 43239 EGD BIOPSY SINGLE/MULTIPLE: CPT

## 2025-04-24 PROCEDURE — 88305 TISSUE EXAM BY PATHOLOGIST: CPT | Mod: 26

## 2025-04-24 PROCEDURE — 88312 SPECIAL STAINS GROUP 1: CPT | Mod: 26

## 2025-04-24 PROCEDURE — 88305 TISSUE EXAM BY PATHOLOGIST: CPT

## 2025-04-24 RX ORDER — ATOVAQUONE 750 MG/5ML
100 SUSPENSION ORAL
Refills: 0 | DISCHARGE

## 2025-04-24 NOTE — CHART NOTE - NSCHARTNOTEFT_GEN_A_CORE
PACU ANESTHESIA ADMISSION NOTE      Procedure:   Post op diagnosis:      ____  Intubated  TV:______       Rate: ______      FiO2: ______    __x__  Patent Airway    __x__  Full return of protective reflexes    __x__  Full recovery from anesthesia / back to baseline status    Vitals:  T(C): 36.4 (04-24-25 @ 09:15), Max: 36.4 (04-24-25 @ 08:56)  HR: 65 (04-24-25 @ 09:15) (65 - 65)  BP: 149/88 (04-24-25 @ 09:15) (149/88 - 149/88)  RR: 17 (04-24-25 @ 09:15) (17 - 17)  SpO2: 99% (04-24-25 @ 09:15) (99% - 99%)    Mental Status:  __x__ Awake   ___x__ Alert   _____ Drowsy   _____ Sedated    Nausea/Vomiting:  __x__ NO  ______Yes,   See Post - Op Orders          Pain Scale (0-10):  ___0__    Treatment: ____ None    __x__ See Post - Op/PCA Orders    Post - Operative Fluids:   ____ Oral   __x__ See Post - Op Orders    Plan: Discharge:   __x__Home       _____Floor     _____Critical Care    _____  Other:_________________    Comments: Patient had smooth intraoperative event, no anesthesia complication.  PACU Vital signs: See anesthesia record, pt awake and alert in PACU at time of handoff.

## 2025-04-24 NOTE — ASU DISCHARGE PLAN (ADULT/PEDIATRIC) - FINANCIAL ASSISTANCE
[facility] provides services at a reduced cost to those who are determined to be eligible through [facility]’s financial assistance program. Information regarding [facility]’s financial assistance program can be found by going to [email or web address] or by calling [phone].

## 2025-04-24 NOTE — ASU PATIENT PROFILE, ADULT - NS PRO ABUSE SCREEN AFRAID ANYONE YN
As we discussed your illness is viral.  No antibiotics are indicated at this time.  Rest and drink extra fluids.  OTC cough and cold medications as needed.  Tylenol or Motrin as needed for pain or fever.  Salt water gargles and throat lozenges for sore throat.  Follow up with PCP if no improvement.  Go to ER with worsening symptoms.      Cold Symptoms   WHAT YOU NEED TO KNOW:   A cold is an infection caused by a virus. The infection causes your upper respiratory system to become inflamed. Common symptoms of a cold include sneezing, dry throat, a stuffy nose, headache, watery eyes, and a cough. Your cough may be dry, or you may cough up mucus. You may also have muscle aches, joint pain, and tiredness. Rarely, you may have a fever. Most colds go away without treatment.  DISCHARGE INSTRUCTIONS:   Return to the emergency department if:   You have increased tiredness and weakness.    You are unable to eat.     Your heart is beating much faster than usual for you.     You see white spots in the back of your throat and your neck is swollen and sore to the touch.     You see pinpoint or larger reddish-purple dots on your skin.  Contact your healthcare provider if:   You have a fever higher than 102°F (38.9°C).    You have new or worsening shortness of breath.     You have thick nasal drainage for more than 2 days.     Your symptoms do not improve or get worse within 5 days.     You have questions or concerns about your condition or care.  Medicines:  The following medicines may be suggested by your healthcare provider to decrease your cold symptoms. These medicines are available without a doctor's order. Ask which medicines to take and when to take them. Follow directions.  NSAIDs or acetaminophen  help to bring down a fever or decrease pain.    Decongestants  help decrease nasal stuffiness.     Antihistamines  help decrease sneezing and a runny nose.     Cough suppressants  help decrease how much you cough.    Expectorants   help loosen mucus so you can cough it up.    Take your medicine as directed.  Contact your healthcare provider if you think your medicine is not helping or if you have side effects. Tell him of her if you are allergic to any medicine. Keep a list of the medicines, vitamins, and herbs you take. Include the amounts, and when and why you take them. Bring the list or the pill bottles to follow-up visits. Carry your medicine list with you in case of an emergency.  Symptom relief:  The following may help relieve cold symptoms, such as a dry throat and congestion:  Gargle with mouthwash or warm salt water as directed.     Suck on throat lozenges or hard candy.     Use a cold or warm vaporizer or humidifier to ease your breathing.     Rest for at least 2 days and then as needed to decrease tiredness and weakness.     Use petroleum based jelly around your nostrils to decrease irritation from blowing your nose.  Drink liquids:  Liquids will help thin and loosen thick mucus so you can cough it up. Liquids will also keep you hydrated. Ask your healthcare provider which liquids are best for you and how much to drink each day.  Prevent the spread of germs:  You can spread your cold germs to others for at least 3 days after your symptoms start. Wash your hands often. Do not share items, such as eating utensils. Cover your nose and mouth when you cough or sneeze using the crook of your elbow instead of your hands. Throw used tissues in the garbage.  Do not smoke:  Smoking may worsen your symptoms and increase the length of time you feel sick. Talk with your healthcare provider if you need help to stop smoking.  Follow up with your healthcare provider as directed:  Write down your questions so you remember to ask them during your visits.   © 2017 [employer] Information is for End User's use only and may not be sold, redistributed or otherwise used for commercial purposes. All illustrations and images included in  CareNotes® are the copyrighted property of [employer], [employer] or [employer].  The above information is an  only. It is not intended as medical advice for individual conditions or treatments. Talk to your doctor, nurse or pharmacist before following any medical regimen to see if it is safe and effective for you.     no

## 2025-04-24 NOTE — ASU DISCHARGE PLAN (ADULT/PEDIATRIC) - CARE PROVIDER_API CALL
Marco Antonio Crowell  Gastroenterology  [address] [place] [id number]  Phone: [phone]  Fax: [phone]  Follow Up Time:

## 2025-04-24 NOTE — ASU PATIENT PROFILE, ADULT - FALL HARM RISK - HARM RISK INTERVENTIONS

## 2025-04-25 LAB — SURGICAL PATHOLOGY STUDY: SIGNIFICANT CHANGE UP

## 2025-04-28 DIAGNOSIS — K44.9 DIAPHRAGMATIC HERNIA WITHOUT OBSTRUCTION OR GANGRENE: ICD-10-CM

## 2025-04-28 DIAGNOSIS — K20.90 ESOPHAGITIS, UNSPECIFIED WITHOUT BLEEDING: ICD-10-CM

## 2025-04-28 DIAGNOSIS — Z01.818 ENCOUNTER FOR OTHER PREPROCEDURAL EXAMINATION: ICD-10-CM

## 2025-04-28 DIAGNOSIS — E66.9 OBESITY, UNSPECIFIED: ICD-10-CM

## 2025-04-28 DIAGNOSIS — K29.50 UNSPECIFIED CHRONIC GASTRITIS WITHOUT BLEEDING: ICD-10-CM

## 2025-04-28 DIAGNOSIS — Z88.0 ALLERGY STATUS TO PENICILLIN: ICD-10-CM

## 2025-05-19 ENCOUNTER — NON-APPOINTMENT (OUTPATIENT)
Age: 45
End: 2025-05-19

## 2025-06-10 ENCOUNTER — APPOINTMENT (OUTPATIENT)
Dept: SURGERY | Facility: CLINIC | Age: 45
End: 2025-06-10

## 2025-06-19 ENCOUNTER — APPOINTMENT (OUTPATIENT)
Dept: SURGERY | Facility: CLINIC | Age: 45
End: 2025-06-19

## 2025-06-19 VITALS — WEIGHT: 276 LBS | HEIGHT: 71 IN | BODY MASS INDEX: 38.64 KG/M2

## 2025-06-19 PROCEDURE — 97803 MED NUTRITION INDIV SUBSEQ: CPT | Mod: 95

## 2025-07-01 ENCOUNTER — TRANSCRIPTION ENCOUNTER (OUTPATIENT)
Age: 45
End: 2025-07-01

## 2025-07-01 ENCOUNTER — APPOINTMENT (OUTPATIENT)
Dept: SURGERY | Facility: CLINIC | Age: 45
End: 2025-07-01

## 2025-07-01 VITALS — BODY MASS INDEX: 38.78 KG/M2 | HEIGHT: 71 IN | WEIGHT: 277 LBS

## 2025-07-01 PROCEDURE — 97803 MED NUTRITION INDIV SUBSEQ: CPT

## 2025-07-08 ENCOUNTER — NON-APPOINTMENT (OUTPATIENT)
Age: 45
End: 2025-07-08

## 2025-07-18 ENCOUNTER — NON-APPOINTMENT (OUTPATIENT)
Age: 45
End: 2025-07-18

## 2025-07-21 ENCOUNTER — OUTPATIENT (OUTPATIENT)
Dept: OUTPATIENT SERVICES | Facility: HOSPITAL | Age: 45
LOS: 1 days | End: 2025-07-21
Payer: COMMERCIAL

## 2025-07-21 VITALS
RESPIRATION RATE: 16 BRPM | HEART RATE: 80 BPM | OXYGEN SATURATION: 98 % | TEMPERATURE: 98 F | DIASTOLIC BLOOD PRESSURE: 67 MMHG | HEIGHT: 71 IN | WEIGHT: 279.99 LBS | SYSTOLIC BLOOD PRESSURE: 101 MMHG

## 2025-07-21 DIAGNOSIS — Z98.890 OTHER SPECIFIED POSTPROCEDURAL STATES: Chronic | ICD-10-CM

## 2025-07-21 DIAGNOSIS — Z01.818 ENCOUNTER FOR OTHER PREPROCEDURAL EXAMINATION: ICD-10-CM

## 2025-07-21 DIAGNOSIS — E66.01 MORBID (SEVERE) OBESITY DUE TO EXCESS CALORIES: ICD-10-CM

## 2025-07-21 DIAGNOSIS — Z98.51 TUBAL LIGATION STATUS: Chronic | ICD-10-CM

## 2025-07-21 LAB
A1C WITH ESTIMATED AVERAGE GLUCOSE RESULT: 5.3 % — SIGNIFICANT CHANGE UP (ref 4–5.6)
ALBUMIN SERPL ELPH-MCNC: 4.2 G/DL — SIGNIFICANT CHANGE UP (ref 3.5–5.2)
ALP SERPL-CCNC: 89 U/L — SIGNIFICANT CHANGE UP (ref 30–115)
ALT FLD-CCNC: 19 U/L — SIGNIFICANT CHANGE UP (ref 0–41)
ANION GAP SERPL CALC-SCNC: 11 MMOL/L — SIGNIFICANT CHANGE UP (ref 7–14)
APTT BLD: 29.4 SEC — SIGNIFICANT CHANGE UP (ref 27–39.2)
AST SERPL-CCNC: 19 U/L — SIGNIFICANT CHANGE UP (ref 0–41)
BASOPHILS # BLD AUTO: 0.02 K/UL — SIGNIFICANT CHANGE UP (ref 0–0.2)
BASOPHILS NFR BLD AUTO: 0.5 % — SIGNIFICANT CHANGE UP (ref 0–1)
BILIRUB SERPL-MCNC: 0.3 MG/DL — SIGNIFICANT CHANGE UP (ref 0.2–1.2)
BLD GP AB SCN SERPL QL: SIGNIFICANT CHANGE UP
BUN SERPL-MCNC: 14 MG/DL — SIGNIFICANT CHANGE UP (ref 10–20)
CALCIUM SERPL-MCNC: 9.1 MG/DL — SIGNIFICANT CHANGE UP (ref 8.4–10.5)
CHLORIDE SERPL-SCNC: 104 MMOL/L — SIGNIFICANT CHANGE UP (ref 98–110)
CO2 SERPL-SCNC: 24 MMOL/L — SIGNIFICANT CHANGE UP (ref 17–32)
CREAT SERPL-MCNC: 0.9 MG/DL — SIGNIFICANT CHANGE UP (ref 0.7–1.5)
EGFR: 81 ML/MIN/1.73M2 — SIGNIFICANT CHANGE UP
EGFR: 81 ML/MIN/1.73M2 — SIGNIFICANT CHANGE UP
EOSINOPHIL # BLD AUTO: 0.15 K/UL — SIGNIFICANT CHANGE UP (ref 0–0.7)
EOSINOPHIL NFR BLD AUTO: 3.4 % — SIGNIFICANT CHANGE UP (ref 0–8)
ESTIMATED AVERAGE GLUCOSE: 105 MG/DL — SIGNIFICANT CHANGE UP (ref 68–114)
GLUCOSE SERPL-MCNC: 83 MG/DL — SIGNIFICANT CHANGE UP (ref 70–99)
HCT VFR BLD CALC: 32.2 % — LOW (ref 37–47)
HGB BLD-MCNC: 10.1 G/DL — LOW (ref 12–16)
IMM GRANULOCYTES NFR BLD AUTO: 0.2 % — SIGNIFICANT CHANGE UP (ref 0.1–0.3)
INR BLD: 0.93 RATIO — SIGNIFICANT CHANGE UP (ref 0.65–1.3)
LYMPHOCYTES # BLD AUTO: 1.7 K/UL — SIGNIFICANT CHANGE UP (ref 1.2–3.4)
LYMPHOCYTES # BLD AUTO: 38.3 % — SIGNIFICANT CHANGE UP (ref 20.5–51.1)
MCHC RBC-ENTMCNC: 26 PG — LOW (ref 27–31)
MCHC RBC-ENTMCNC: 31.4 G/DL — LOW (ref 32–37)
MCV RBC AUTO: 83 FL — SIGNIFICANT CHANGE UP (ref 81–99)
MONOCYTES # BLD AUTO: 0.44 K/UL — SIGNIFICANT CHANGE UP (ref 0.1–0.6)
MONOCYTES NFR BLD AUTO: 9.9 % — HIGH (ref 1.7–9.3)
NEUTROPHILS # BLD AUTO: 2.12 K/UL — SIGNIFICANT CHANGE UP (ref 1.4–6.5)
NEUTROPHILS NFR BLD AUTO: 47.7 % — SIGNIFICANT CHANGE UP (ref 42.2–75.2)
NRBC BLD AUTO-RTO: 0 /100 WBCS — SIGNIFICANT CHANGE UP (ref 0–0)
PLATELET # BLD AUTO: 265 K/UL — SIGNIFICANT CHANGE UP (ref 130–400)
PMV BLD: 11.6 FL — HIGH (ref 7.4–10.4)
POTASSIUM SERPL-MCNC: 3.9 MMOL/L — SIGNIFICANT CHANGE UP (ref 3.5–5)
POTASSIUM SERPL-SCNC: 3.9 MMOL/L — SIGNIFICANT CHANGE UP (ref 3.5–5)
PROT SERPL-MCNC: 7.2 G/DL — SIGNIFICANT CHANGE UP (ref 6–8)
PROTHROM AB SERPL-ACNC: 10.9 SEC — SIGNIFICANT CHANGE UP (ref 9.95–12.87)
RBC # BLD: 3.88 M/UL — LOW (ref 4.2–5.4)
RBC # FLD: 15 % — HIGH (ref 11.5–14.5)
SODIUM SERPL-SCNC: 139 MMOL/L — SIGNIFICANT CHANGE UP (ref 135–146)
WBC # BLD: 4.44 K/UL — LOW (ref 4.8–10.8)
WBC # FLD AUTO: 4.44 K/UL — LOW (ref 4.8–10.8)

## 2025-07-21 PROCEDURE — 86901 BLOOD TYPING SEROLOGIC RH(D): CPT

## 2025-07-21 PROCEDURE — 99214 OFFICE O/P EST MOD 30 MIN: CPT | Mod: 25

## 2025-07-21 PROCEDURE — 86850 RBC ANTIBODY SCREEN: CPT

## 2025-07-21 PROCEDURE — 83036 HEMOGLOBIN GLYCOSYLATED A1C: CPT

## 2025-07-21 PROCEDURE — 36415 COLL VENOUS BLD VENIPUNCTURE: CPT

## 2025-07-21 PROCEDURE — 85730 THROMBOPLASTIN TIME PARTIAL: CPT

## 2025-07-21 PROCEDURE — 85610 PROTHROMBIN TIME: CPT

## 2025-07-21 PROCEDURE — 86900 BLOOD TYPING SEROLOGIC ABO: CPT

## 2025-07-21 PROCEDURE — 85025 COMPLETE CBC W/AUTO DIFF WBC: CPT

## 2025-07-21 PROCEDURE — 80053 COMPREHEN METABOLIC PANEL: CPT

## 2025-07-22 DIAGNOSIS — E66.01 MORBID (SEVERE) OBESITY DUE TO EXCESS CALORIES: ICD-10-CM

## 2025-07-22 DIAGNOSIS — Z01.818 ENCOUNTER FOR OTHER PREPROCEDURAL EXAMINATION: ICD-10-CM

## 2025-07-23 DIAGNOSIS — K29.50 UNSPECIFIED CHRONIC GASTRITIS W/OUT BLEEDING: ICD-10-CM

## 2025-07-23 RX ORDER — CELECOXIB 200 MG/1
200 CAPSULE ORAL
Qty: 4 | Refills: 0 | Status: ACTIVE | COMMUNITY
Start: 2025-07-23 | End: 1900-01-01

## 2025-07-23 RX ORDER — OMEPRAZOLE 40 MG/1
40 CAPSULE, DELAYED RELEASE ORAL
Qty: 30 | Refills: 2 | Status: ACTIVE | COMMUNITY
Start: 2025-07-23 | End: 1900-01-01

## 2025-07-25 ENCOUNTER — APPOINTMENT (OUTPATIENT)
Dept: SURGERY | Facility: CLINIC | Age: 45
End: 2025-07-25
Payer: COMMERCIAL

## 2025-07-25 VITALS
TEMPERATURE: 97 F | SYSTOLIC BLOOD PRESSURE: 130 MMHG | DIASTOLIC BLOOD PRESSURE: 82 MMHG | OXYGEN SATURATION: 98 % | HEART RATE: 74 BPM | BODY MASS INDEX: 38.5 KG/M2 | WEIGHT: 275 LBS | HEIGHT: 71 IN

## 2025-07-25 DIAGNOSIS — E66.813 OBESITY, CLASS 3: ICD-10-CM

## 2025-07-25 PROCEDURE — 99215 OFFICE O/P EST HI 40 MIN: CPT

## 2025-08-04 ENCOUNTER — RESULT REVIEW (OUTPATIENT)
Age: 45
End: 2025-08-04

## 2025-08-04 ENCOUNTER — APPOINTMENT (OUTPATIENT)
Dept: SURGERY | Facility: HOSPITAL | Age: 45
End: 2025-08-04

## 2025-08-04 ENCOUNTER — INPATIENT (INPATIENT)
Facility: HOSPITAL | Age: 45
LOS: 1 days | Discharge: HOME | DRG: 641 | End: 2025-08-06
Attending: STUDENT IN AN ORGANIZED HEALTH CARE EDUCATION/TRAINING PROGRAM | Admitting: STUDENT IN AN ORGANIZED HEALTH CARE EDUCATION/TRAINING PROGRAM
Payer: COMMERCIAL

## 2025-08-04 VITALS
DIASTOLIC BLOOD PRESSURE: 67 MMHG | RESPIRATION RATE: 18 BRPM | HEIGHT: 71 IN | OXYGEN SATURATION: 99 % | TEMPERATURE: 98 F | SYSTOLIC BLOOD PRESSURE: 136 MMHG | HEART RATE: 51 BPM | WEIGHT: 279.99 LBS

## 2025-08-04 DIAGNOSIS — Z98.51 TUBAL LIGATION STATUS: Chronic | ICD-10-CM

## 2025-08-04 DIAGNOSIS — Z98.890 OTHER SPECIFIED POSTPROCEDURAL STATES: Chronic | ICD-10-CM

## 2025-08-04 DIAGNOSIS — E66.01 MORBID (SEVERE) OBESITY DUE TO EXCESS CALORIES: ICD-10-CM

## 2025-08-04 LAB
ANION GAP SERPL CALC-SCNC: 11 MMOL/L — SIGNIFICANT CHANGE UP (ref 7–14)
BASOPHILS # BLD AUTO: 0 K/UL — SIGNIFICANT CHANGE UP (ref 0–0.2)
BASOPHILS NFR BLD AUTO: 0 % — SIGNIFICANT CHANGE UP (ref 0–1)
BUN SERPL-MCNC: 9 MG/DL — LOW (ref 10–20)
CALCIUM SERPL-MCNC: 8.8 MG/DL — SIGNIFICANT CHANGE UP (ref 8.4–10.5)
CHLORIDE SERPL-SCNC: 98 MMOL/L — SIGNIFICANT CHANGE UP (ref 98–110)
CO2 SERPL-SCNC: 26 MMOL/L — SIGNIFICANT CHANGE UP (ref 17–32)
CREAT SERPL-MCNC: 0.7 MG/DL — SIGNIFICANT CHANGE UP (ref 0.7–1.5)
EGFR: 109 ML/MIN/1.73M2 — SIGNIFICANT CHANGE UP
EGFR: 109 ML/MIN/1.73M2 — SIGNIFICANT CHANGE UP
EOSINOPHIL # BLD AUTO: 0.01 K/UL — SIGNIFICANT CHANGE UP (ref 0–0.7)
EOSINOPHIL NFR BLD AUTO: 0.1 % — SIGNIFICANT CHANGE UP (ref 0–8)
GLUCOSE SERPL-MCNC: 159 MG/DL — HIGH (ref 70–99)
HCT VFR BLD CALC: 32.2 % — LOW (ref 37–47)
HGB BLD-MCNC: 10.1 G/DL — LOW (ref 12–16)
IMM GRANULOCYTES NFR BLD AUTO: 0.6 % — HIGH (ref 0.1–0.3)
LYMPHOCYTES # BLD AUTO: 0.51 K/UL — LOW (ref 1.2–3.4)
LYMPHOCYTES # BLD AUTO: 6.1 % — LOW (ref 20.5–51.1)
MCHC RBC-ENTMCNC: 26.1 PG — LOW (ref 27–31)
MCHC RBC-ENTMCNC: 31.4 G/DL — LOW (ref 32–37)
MCV RBC AUTO: 83.2 FL — SIGNIFICANT CHANGE UP (ref 81–99)
MONOCYTES # BLD AUTO: 0.07 K/UL — LOW (ref 0.1–0.6)
MONOCYTES NFR BLD AUTO: 0.8 % — LOW (ref 1.7–9.3)
NEUTROPHILS # BLD AUTO: 7.72 K/UL — HIGH (ref 1.4–6.5)
NEUTROPHILS NFR BLD AUTO: 92.4 % — HIGH (ref 42.2–75.2)
NRBC BLD AUTO-RTO: 0 /100 WBCS — SIGNIFICANT CHANGE UP (ref 0–0)
PLATELET # BLD AUTO: 258 K/UL — SIGNIFICANT CHANGE UP (ref 130–400)
PMV BLD: 11.4 FL — HIGH (ref 7.4–10.4)
POTASSIUM SERPL-MCNC: 3.6 MMOL/L — SIGNIFICANT CHANGE UP (ref 3.5–5)
POTASSIUM SERPL-SCNC: 3.6 MMOL/L — SIGNIFICANT CHANGE UP (ref 3.5–5)
RBC # BLD: 3.87 M/UL — LOW (ref 4.2–5.4)
RBC # FLD: 14.2 % — SIGNIFICANT CHANGE UP (ref 11.5–14.5)
SODIUM SERPL-SCNC: 135 MMOL/L — SIGNIFICANT CHANGE UP (ref 135–146)
WBC # BLD: 8.36 K/UL — SIGNIFICANT CHANGE UP (ref 4.8–10.8)
WBC # FLD AUTO: 8.36 K/UL — SIGNIFICANT CHANGE UP (ref 4.8–10.8)

## 2025-08-04 PROCEDURE — S2900: CPT

## 2025-08-04 PROCEDURE — C1889: CPT

## 2025-08-04 PROCEDURE — 88305 TISSUE EXAM BY PATHOLOGIST: CPT | Mod: 26

## 2025-08-04 PROCEDURE — 88342 IMHCHEM/IMCYTCHM 1ST ANTB: CPT

## 2025-08-04 PROCEDURE — 88342 IMHCHEM/IMCYTCHM 1ST ANTB: CPT | Mod: 26

## 2025-08-04 PROCEDURE — 43775 LAP SLEEVE GASTRECTOMY: CPT

## 2025-08-04 PROCEDURE — 36415 COLL VENOUS BLD VENIPUNCTURE: CPT

## 2025-08-04 PROCEDURE — 85025 COMPLETE CBC W/AUTO DIFF WBC: CPT

## 2025-08-04 PROCEDURE — S2900 ROBOTIC SURGICAL SYSTEM: CPT | Mod: NC

## 2025-08-04 PROCEDURE — C9399: CPT

## 2025-08-04 PROCEDURE — 80048 BASIC METABOLIC PNL TOTAL CA: CPT

## 2025-08-04 PROCEDURE — 88305 TISSUE EXAM BY PATHOLOGIST: CPT

## 2025-08-04 RX ORDER — LABETALOL HYDROCHLORIDE 200 MG/1
5 TABLET, FILM COATED ORAL
Refills: 0 | Status: COMPLETED | OUTPATIENT
Start: 2025-08-04 | End: 2025-08-04

## 2025-08-04 RX ORDER — ACETAMINOPHEN 500 MG/5ML
1000 LIQUID (ML) ORAL ONCE
Refills: 0 | Status: DISCONTINUED | OUTPATIENT
Start: 2025-08-04 | End: 2025-08-04

## 2025-08-04 RX ORDER — HYDROMORPHONE/SOD CHLOR,ISO/PF 2 MG/10 ML
0.5 SYRINGE (ML) INJECTION
Refills: 0 | Status: DISCONTINUED | OUTPATIENT
Start: 2025-08-04 | End: 2025-08-04

## 2025-08-04 RX ORDER — APREPITANT 40 MG/1
40 CAPSULE ORAL ONCE
Refills: 0 | Status: COMPLETED | OUTPATIENT
Start: 2025-08-04 | End: 2025-08-04

## 2025-08-04 RX ORDER — ONDANSETRON HCL/PF 4 MG/2 ML
4 VIAL (ML) INJECTION ONCE
Refills: 0 | Status: DISCONTINUED | OUTPATIENT
Start: 2025-08-04 | End: 2025-08-04

## 2025-08-04 RX ORDER — HYDROMORPHONE/SOD CHLOR,ISO/PF 2 MG/10 ML
1 SYRINGE (ML) INJECTION
Refills: 0 | Status: DISCONTINUED | OUTPATIENT
Start: 2025-08-04 | End: 2025-08-04

## 2025-08-04 RX ORDER — ENOXAPARIN SODIUM 100 MG/ML
40 INJECTION SUBCUTANEOUS EVERY 24 HOURS
Refills: 0 | Status: DISCONTINUED | OUTPATIENT
Start: 2025-08-05 | End: 2025-08-06

## 2025-08-04 RX ORDER — ACETAMINOPHEN 500 MG/5ML
650 LIQUID (ML) ORAL EVERY 6 HOURS
Refills: 0 | Status: DISCONTINUED | OUTPATIENT
Start: 2025-08-04 | End: 2025-08-06

## 2025-08-04 RX ORDER — SODIUM CHLORIDE 9 G/1000ML
1000 INJECTION, SOLUTION INTRAVENOUS
Refills: 0 | Status: DISCONTINUED | OUTPATIENT
Start: 2025-08-04 | End: 2025-08-06

## 2025-08-04 RX ORDER — ENOXAPARIN SODIUM 100 MG/ML
40 INJECTION SUBCUTANEOUS ONCE
Refills: 0 | Status: COMPLETED | OUTPATIENT
Start: 2025-08-04 | End: 2025-08-04

## 2025-08-04 RX ORDER — ONDANSETRON HCL/PF 4 MG/2 ML
4 VIAL (ML) INJECTION EVERY 6 HOURS
Refills: 0 | Status: DISCONTINUED | OUTPATIENT
Start: 2025-08-04 | End: 2025-08-06

## 2025-08-04 RX ORDER — ACETAMINOPHEN 500 MG/5ML
1000 LIQUID (ML) ORAL ONCE
Refills: 0 | Status: COMPLETED | OUTPATIENT
Start: 2025-08-04 | End: 2025-08-05

## 2025-08-04 RX ADMIN — Medication 40 MILLIGRAM(S): at 13:43

## 2025-08-04 RX ADMIN — LABETALOL HYDROCHLORIDE 5 MILLIGRAM(S): 200 TABLET, FILM COATED ORAL at 11:45

## 2025-08-04 RX ADMIN — Medication 650 MILLIGRAM(S): at 18:20

## 2025-08-04 RX ADMIN — Medication 650 MILLIGRAM(S): at 17:57

## 2025-08-04 RX ADMIN — ENOXAPARIN SODIUM 40 MILLIGRAM(S): 100 INJECTION SUBCUTANEOUS at 07:03

## 2025-08-04 RX ADMIN — APREPITANT 40 MILLIGRAM(S): 40 CAPSULE ORAL at 07:03

## 2025-08-04 RX ADMIN — Medication 0.12 MILLIGRAM(S): at 17:57

## 2025-08-04 RX ADMIN — Medication 4 MILLIGRAM(S): at 17:57

## 2025-08-04 RX ADMIN — Medication 0.12 MILLIGRAM(S): at 13:43

## 2025-08-04 RX ADMIN — LABETALOL HYDROCHLORIDE 5 MILLIGRAM(S): 200 TABLET, FILM COATED ORAL at 12:00

## 2025-08-04 RX ADMIN — Medication 0.12 MILLIGRAM(S): at 21:29

## 2025-08-04 RX ADMIN — Medication 0.5 MILLIGRAM(S): at 11:15

## 2025-08-04 RX ADMIN — Medication 50 MILLIEQUIVALENT(S): at 21:33

## 2025-08-05 ENCOUNTER — TRANSCRIPTION ENCOUNTER (OUTPATIENT)
Age: 45
End: 2025-08-05

## 2025-08-05 LAB
ANION GAP SERPL CALC-SCNC: 13 MMOL/L — SIGNIFICANT CHANGE UP (ref 7–14)
BASOPHILS # BLD AUTO: 0 K/UL — SIGNIFICANT CHANGE UP (ref 0–0.2)
BASOPHILS NFR BLD AUTO: 0 % — SIGNIFICANT CHANGE UP (ref 0–1)
BUN SERPL-MCNC: 9 MG/DL — LOW (ref 10–20)
CALCIUM SERPL-MCNC: 9.1 MG/DL — SIGNIFICANT CHANGE UP (ref 8.4–10.5)
CHLORIDE SERPL-SCNC: 104 MMOL/L — SIGNIFICANT CHANGE UP (ref 98–110)
CO2 SERPL-SCNC: 24 MMOL/L — SIGNIFICANT CHANGE UP (ref 17–32)
CREAT SERPL-MCNC: 0.7 MG/DL — SIGNIFICANT CHANGE UP (ref 0.7–1.5)
EGFR: 109 ML/MIN/1.73M2 — SIGNIFICANT CHANGE UP
EGFR: 109 ML/MIN/1.73M2 — SIGNIFICANT CHANGE UP
EOSINOPHIL # BLD AUTO: 0 K/UL — SIGNIFICANT CHANGE UP (ref 0–0.7)
EOSINOPHIL NFR BLD AUTO: 0 % — SIGNIFICANT CHANGE UP (ref 0–8)
GLUCOSE SERPL-MCNC: 115 MG/DL — HIGH (ref 70–99)
HCT VFR BLD CALC: 31.4 % — LOW (ref 37–47)
HGB BLD-MCNC: 10.2 G/DL — LOW (ref 12–16)
IMM GRANULOCYTES NFR BLD AUTO: 0.4 % — HIGH (ref 0.1–0.3)
LYMPHOCYTES # BLD AUTO: 0.71 K/UL — LOW (ref 1.2–3.4)
LYMPHOCYTES # BLD AUTO: 10 % — LOW (ref 20.5–51.1)
MCHC RBC-ENTMCNC: 26.8 PG — LOW (ref 27–31)
MCHC RBC-ENTMCNC: 32.5 G/DL — SIGNIFICANT CHANGE UP (ref 32–37)
MCV RBC AUTO: 82.4 FL — SIGNIFICANT CHANGE UP (ref 81–99)
MONOCYTES # BLD AUTO: 0.42 K/UL — SIGNIFICANT CHANGE UP (ref 0.1–0.6)
MONOCYTES NFR BLD AUTO: 5.9 % — SIGNIFICANT CHANGE UP (ref 1.7–9.3)
NEUTROPHILS # BLD AUTO: 5.93 K/UL — SIGNIFICANT CHANGE UP (ref 1.4–6.5)
NEUTROPHILS NFR BLD AUTO: 83.7 % — HIGH (ref 42.2–75.2)
NRBC BLD AUTO-RTO: 0 /100 WBCS — SIGNIFICANT CHANGE UP (ref 0–0)
PLATELET # BLD AUTO: 285 K/UL — SIGNIFICANT CHANGE UP (ref 130–400)
PMV BLD: 11.8 FL — HIGH (ref 7.4–10.4)
POTASSIUM SERPL-MCNC: 4.4 MMOL/L — SIGNIFICANT CHANGE UP (ref 3.5–5)
POTASSIUM SERPL-SCNC: 4.4 MMOL/L — SIGNIFICANT CHANGE UP (ref 3.5–5)
RBC # BLD: 3.81 M/UL — LOW (ref 4.2–5.4)
RBC # FLD: 14 % — SIGNIFICANT CHANGE UP (ref 11.5–14.5)
SODIUM SERPL-SCNC: 141 MMOL/L — SIGNIFICANT CHANGE UP (ref 135–146)
WBC # BLD: 7.09 K/UL — SIGNIFICANT CHANGE UP (ref 4.8–10.8)
WBC # FLD AUTO: 7.09 K/UL — SIGNIFICANT CHANGE UP (ref 4.8–10.8)

## 2025-08-05 RX ADMIN — Medication 4 MILLIGRAM(S): at 18:18

## 2025-08-05 RX ADMIN — Medication 4 MILLIGRAM(S): at 05:52

## 2025-08-05 RX ADMIN — Medication 0.12 MILLIGRAM(S): at 02:12

## 2025-08-05 RX ADMIN — Medication 650 MILLIGRAM(S): at 23:07

## 2025-08-05 RX ADMIN — Medication 0.12 MILLIGRAM(S): at 23:07

## 2025-08-05 RX ADMIN — ENOXAPARIN SODIUM 40 MILLIGRAM(S): 100 INJECTION SUBCUTANEOUS at 05:52

## 2025-08-05 RX ADMIN — Medication 650 MILLIGRAM(S): at 12:10

## 2025-08-05 RX ADMIN — Medication 4 MILLIGRAM(S): at 23:07

## 2025-08-05 RX ADMIN — Medication 0.12 MILLIGRAM(S): at 05:52

## 2025-08-05 RX ADMIN — Medication 0.12 MILLIGRAM(S): at 12:10

## 2025-08-05 RX ADMIN — Medication 400 MILLIGRAM(S): at 04:55

## 2025-08-05 RX ADMIN — Medication 1 APPLICATION(S): at 18:20

## 2025-08-05 RX ADMIN — Medication 4 MILLIGRAM(S): at 12:10

## 2025-08-05 RX ADMIN — Medication 650 MILLIGRAM(S): at 18:17

## 2025-08-05 RX ADMIN — Medication 0.12 MILLIGRAM(S): at 18:17

## 2025-08-06 ENCOUNTER — TRANSCRIPTION ENCOUNTER (OUTPATIENT)
Age: 45
End: 2025-08-06

## 2025-08-06 VITALS
SYSTOLIC BLOOD PRESSURE: 149 MMHG | RESPIRATION RATE: 18 BRPM | TEMPERATURE: 98 F | DIASTOLIC BLOOD PRESSURE: 85 MMHG | HEART RATE: 57 BPM | OXYGEN SATURATION: 98 %

## 2025-08-06 RX ADMIN — Medication 4 MILLIGRAM(S): at 05:06

## 2025-08-06 RX ADMIN — ENOXAPARIN SODIUM 40 MILLIGRAM(S): 100 INJECTION SUBCUTANEOUS at 05:04

## 2025-08-06 RX ADMIN — Medication 0.12 MILLIGRAM(S): at 05:03

## 2025-08-07 ENCOUNTER — NON-APPOINTMENT (OUTPATIENT)
Age: 45
End: 2025-08-07

## 2025-08-12 DIAGNOSIS — E66.01 MORBID (SEVERE) OBESITY DUE TO EXCESS CALORIES: ICD-10-CM

## 2025-08-12 DIAGNOSIS — K44.9 DIAPHRAGMATIC HERNIA WITHOUT OBSTRUCTION OR GANGRENE: ICD-10-CM

## 2025-08-12 DIAGNOSIS — Z88.0 ALLERGY STATUS TO PENICILLIN: ICD-10-CM

## 2025-08-12 DIAGNOSIS — E66.813 OBESITY, CLASS 3: ICD-10-CM

## 2025-08-14 LAB — SURGICAL PATHOLOGY STUDY: SIGNIFICANT CHANGE UP

## 2025-08-15 ENCOUNTER — APPOINTMENT (OUTPATIENT)
Dept: SURGERY | Facility: CLINIC | Age: 45
End: 2025-08-15

## 2025-08-15 VITALS
WEIGHT: 260 LBS | HEIGHT: 71 IN | BODY MASS INDEX: 36.4 KG/M2 | DIASTOLIC BLOOD PRESSURE: 80 MMHG | HEART RATE: 70 BPM | TEMPERATURE: 97 F | SYSTOLIC BLOOD PRESSURE: 128 MMHG | OXYGEN SATURATION: 99 %

## 2025-08-15 PROBLEM — E66.9 OBESITY, UNSPECIFIED: Chronic | Status: ACTIVE | Noted: 2025-07-21

## 2025-08-15 PROCEDURE — 99024 POSTOP FOLLOW-UP VISIT: CPT

## 2025-08-15 RX ORDER — HYOSCYAMINE SULFATE 0.12 MG/1
0.12 TABLET SUBLINGUAL EVERY 4 HOURS
Qty: 42 | Refills: 0 | Status: ACTIVE | COMMUNITY
Start: 2025-08-15 | End: 1900-01-01

## 2025-09-09 ENCOUNTER — APPOINTMENT (OUTPATIENT)
Dept: OBGYN | Facility: CLINIC | Age: 45
End: 2025-09-09

## 2025-09-17 ENCOUNTER — APPOINTMENT (OUTPATIENT)
Dept: SURGERY | Facility: CLINIC | Age: 45
End: 2025-09-17
Payer: COMMERCIAL

## 2025-09-17 VITALS
HEIGHT: 71 IN | DIASTOLIC BLOOD PRESSURE: 70 MMHG | BODY MASS INDEX: 35.42 KG/M2 | WEIGHT: 253 LBS | SYSTOLIC BLOOD PRESSURE: 124 MMHG

## 2025-09-17 DIAGNOSIS — E66.813 OBESITY, CLASS 3: ICD-10-CM

## 2025-09-17 PROCEDURE — 99024 POSTOP FOLLOW-UP VISIT: CPT
